# Patient Record
Sex: FEMALE | URBAN - METROPOLITAN AREA
[De-identification: names, ages, dates, MRNs, and addresses within clinical notes are randomized per-mention and may not be internally consistent; named-entity substitution may affect disease eponyms.]

---

## 2017-03-11 ENCOUNTER — OUTPATIENT (OUTPATIENT)
Dept: OUTPATIENT SERVICES | Facility: HOSPITAL | Age: 67
LOS: 1 days | Discharge: HOME | End: 2017-03-11

## 2017-06-27 DIAGNOSIS — R73.09 OTHER ABNORMAL GLUCOSE: ICD-10-CM

## 2017-06-27 DIAGNOSIS — L30.9 DERMATITIS, UNSPECIFIED: ICD-10-CM

## 2023-10-18 ENCOUNTER — APPOINTMENT (OUTPATIENT)
Dept: ORTHOPEDIC SURGERY | Facility: CLINIC | Age: 73
End: 2023-10-18
Payer: MEDICARE

## 2023-10-18 VITALS — BODY MASS INDEX: 33.77 KG/M2 | WEIGHT: 172 LBS | HEIGHT: 60 IN

## 2023-10-18 DIAGNOSIS — S63.502A UNSPECIFIED SPRAIN OF LEFT WRIST, INITIAL ENCOUNTER: ICD-10-CM

## 2023-10-18 PROBLEM — Z00.00 ENCOUNTER FOR PREVENTIVE HEALTH EXAMINATION: Status: ACTIVE | Noted: 2023-10-18

## 2023-10-18 PROCEDURE — L3908: CPT | Mod: LT,KX

## 2023-10-18 PROCEDURE — 99203 OFFICE O/P NEW LOW 30 MIN: CPT

## 2023-10-18 PROCEDURE — 73110 X-RAY EXAM OF WRIST: CPT | Mod: LT

## 2023-10-18 RX ORDER — LOSARTAN POTASSIUM 100 MG/1
TABLET, FILM COATED ORAL
Refills: 0 | Status: ACTIVE | COMMUNITY

## 2023-11-24 ENCOUNTER — APPOINTMENT (OUTPATIENT)
Dept: ORTHOPEDIC SURGERY | Facility: CLINIC | Age: 73
End: 2023-11-24

## 2024-12-03 ENCOUNTER — APPOINTMENT (OUTPATIENT)
Dept: PAIN MANAGEMENT | Facility: CLINIC | Age: 74
End: 2024-12-03
Payer: MEDICARE

## 2024-12-03 ENCOUNTER — APPOINTMENT (OUTPATIENT)
Dept: RADIOLOGY | Facility: CLINIC | Age: 74
End: 2024-12-03

## 2024-12-03 DIAGNOSIS — M17.11 UNILATERAL PRIMARY OSTEOARTHRITIS, RIGHT KNEE: ICD-10-CM

## 2024-12-03 DIAGNOSIS — M17.12 UNILATERAL PRIMARY OSTEOARTHRITIS, LEFT KNEE: ICD-10-CM

## 2024-12-03 DIAGNOSIS — M25.561 PAIN IN RIGHT KNEE: ICD-10-CM

## 2024-12-03 DIAGNOSIS — M25.562 PAIN IN RIGHT KNEE: ICD-10-CM

## 2024-12-03 PROCEDURE — 99204 OFFICE O/P NEW MOD 45 MIN: CPT

## 2024-12-03 PROCEDURE — 73560 X-RAY EXAM OF KNEE 1 OR 2: CPT | Mod: 50

## 2024-12-03 RX ORDER — AMLODIPINE BESYLATE 5 MG/1
TABLET ORAL
Refills: 0 | Status: ACTIVE | COMMUNITY

## 2024-12-19 ENCOUNTER — APPOINTMENT (OUTPATIENT)
Dept: PAIN MANAGEMENT | Facility: CLINIC | Age: 74
End: 2024-12-19
Payer: MEDICARE

## 2024-12-19 DIAGNOSIS — M17.12 UNILATERAL PRIMARY OSTEOARTHRITIS, LEFT KNEE: ICD-10-CM

## 2024-12-19 DIAGNOSIS — M25.561 PAIN IN RIGHT KNEE: ICD-10-CM

## 2024-12-19 DIAGNOSIS — M17.11 UNILATERAL PRIMARY OSTEOARTHRITIS, RIGHT KNEE: ICD-10-CM

## 2024-12-19 DIAGNOSIS — M25.562 PAIN IN RIGHT KNEE: ICD-10-CM

## 2024-12-19 PROCEDURE — 99214 OFFICE O/P EST MOD 30 MIN: CPT

## 2025-01-16 ENCOUNTER — APPOINTMENT (OUTPATIENT)
Dept: PAIN MANAGEMENT | Facility: CLINIC | Age: 75
End: 2025-01-16
Payer: MEDICARE

## 2025-01-16 DIAGNOSIS — M25.561 PAIN IN RIGHT KNEE: ICD-10-CM

## 2025-01-16 DIAGNOSIS — M25.562 PAIN IN RIGHT KNEE: ICD-10-CM

## 2025-01-16 PROCEDURE — 99214 OFFICE O/P EST MOD 30 MIN: CPT

## 2025-01-23 ENCOUNTER — APPOINTMENT (OUTPATIENT)
Dept: PAIN MANAGEMENT | Facility: CLINIC | Age: 75
End: 2025-01-23
Payer: MEDICARE

## 2025-01-23 DIAGNOSIS — M17.12 UNILATERAL PRIMARY OSTEOARTHRITIS, LEFT KNEE: ICD-10-CM

## 2025-01-23 DIAGNOSIS — M17.11 UNILATERAL PRIMARY OSTEOARTHRITIS, RIGHT KNEE: ICD-10-CM

## 2025-01-23 PROCEDURE — 20611 DRAIN/INJ JOINT/BURSA W/US: CPT | Mod: 50

## 2025-02-21 ENCOUNTER — APPOINTMENT (OUTPATIENT)
Dept: PAIN MANAGEMENT | Facility: CLINIC | Age: 75
End: 2025-02-21
Payer: MEDICARE

## 2025-02-21 DIAGNOSIS — M25.562 PAIN IN RIGHT KNEE: ICD-10-CM

## 2025-02-21 DIAGNOSIS — M17.11 UNILATERAL PRIMARY OSTEOARTHRITIS, RIGHT KNEE: ICD-10-CM

## 2025-02-21 DIAGNOSIS — M17.12 UNILATERAL PRIMARY OSTEOARTHRITIS, LEFT KNEE: ICD-10-CM

## 2025-02-21 DIAGNOSIS — M25.561 PAIN IN RIGHT KNEE: ICD-10-CM

## 2025-02-21 PROCEDURE — 99213 OFFICE O/P EST LOW 20 MIN: CPT

## 2025-05-12 ENCOUNTER — INPATIENT (INPATIENT)
Facility: HOSPITAL | Age: 75
LOS: 0 days | Discharge: HOME | DRG: 66 | End: 2025-05-13
Attending: STUDENT IN AN ORGANIZED HEALTH CARE EDUCATION/TRAINING PROGRAM | Admitting: STUDENT IN AN ORGANIZED HEALTH CARE EDUCATION/TRAINING PROGRAM
Payer: MEDICARE

## 2025-05-12 VITALS
RESPIRATION RATE: 18 BRPM | SYSTOLIC BLOOD PRESSURE: 142 MMHG | WEIGHT: 176.37 LBS | DIASTOLIC BLOOD PRESSURE: 82 MMHG | OXYGEN SATURATION: 100 % | TEMPERATURE: 97 F | HEIGHT: 60 IN | HEART RATE: 91 BPM

## 2025-05-12 LAB
ALBUMIN SERPL ELPH-MCNC: 4.1 G/DL — SIGNIFICANT CHANGE UP (ref 3.5–5.2)
ALP SERPL-CCNC: 98 U/L — SIGNIFICANT CHANGE UP (ref 30–115)
ALT FLD-CCNC: 20 U/L — SIGNIFICANT CHANGE UP (ref 0–41)
ANION GAP SERPL CALC-SCNC: 10 MMOL/L — SIGNIFICANT CHANGE UP (ref 7–14)
APTT BLD: 27.9 SEC — SIGNIFICANT CHANGE UP (ref 27–39.2)
AST SERPL-CCNC: 22 U/L — SIGNIFICANT CHANGE UP (ref 0–41)
BASOPHILS # BLD AUTO: 0.04 K/UL — SIGNIFICANT CHANGE UP (ref 0–0.2)
BASOPHILS NFR BLD AUTO: 0.5 % — SIGNIFICANT CHANGE UP (ref 0–1)
BILIRUB SERPL-MCNC: 0.7 MG/DL — SIGNIFICANT CHANGE UP (ref 0.2–1.2)
BUN SERPL-MCNC: 14 MG/DL — SIGNIFICANT CHANGE UP (ref 10–20)
CALCIUM SERPL-MCNC: 8.9 MG/DL — SIGNIFICANT CHANGE UP (ref 8.4–10.5)
CHLORIDE SERPL-SCNC: 106 MMOL/L — SIGNIFICANT CHANGE UP (ref 98–110)
CO2 SERPL-SCNC: 22 MMOL/L — SIGNIFICANT CHANGE UP (ref 17–32)
CREAT SERPL-MCNC: 0.6 MG/DL — LOW (ref 0.7–1.5)
EGFR: 94 ML/MIN/1.73M2 — SIGNIFICANT CHANGE UP
EGFR: 94 ML/MIN/1.73M2 — SIGNIFICANT CHANGE UP
EOSINOPHIL # BLD AUTO: 0.07 K/UL — SIGNIFICANT CHANGE UP (ref 0–0.7)
EOSINOPHIL NFR BLD AUTO: 0.9 % — SIGNIFICANT CHANGE UP (ref 0–8)
GLUCOSE SERPL-MCNC: 158 MG/DL — HIGH (ref 70–99)
HCT VFR BLD CALC: 41.2 % — SIGNIFICANT CHANGE UP (ref 37–47)
HGB BLD-MCNC: 13.7 G/DL — SIGNIFICANT CHANGE UP (ref 12–16)
IMM GRANULOCYTES NFR BLD AUTO: 0.3 % — SIGNIFICANT CHANGE UP (ref 0.1–0.3)
INR BLD: 0.98 RATIO — SIGNIFICANT CHANGE UP (ref 0.65–1.3)
LYMPHOCYTES # BLD AUTO: 1.15 K/UL — LOW (ref 1.2–3.4)
LYMPHOCYTES # BLD AUTO: 14.4 % — LOW (ref 20.5–51.1)
MCHC RBC-ENTMCNC: 31.4 PG — HIGH (ref 27–31)
MCHC RBC-ENTMCNC: 33.3 G/DL — SIGNIFICANT CHANGE UP (ref 32–37)
MCV RBC AUTO: 94.3 FL — SIGNIFICANT CHANGE UP (ref 81–99)
MONOCYTES # BLD AUTO: 0.46 K/UL — SIGNIFICANT CHANGE UP (ref 0.1–0.6)
MONOCYTES NFR BLD AUTO: 5.8 % — SIGNIFICANT CHANGE UP (ref 1.7–9.3)
NEUTROPHILS # BLD AUTO: 6.26 K/UL — SIGNIFICANT CHANGE UP (ref 1.4–6.5)
NEUTROPHILS NFR BLD AUTO: 78.1 % — HIGH (ref 42.2–75.2)
NRBC BLD AUTO-RTO: 0 /100 WBCS — SIGNIFICANT CHANGE UP (ref 0–0)
PLATELET # BLD AUTO: 241 K/UL — SIGNIFICANT CHANGE UP (ref 130–400)
PMV BLD: 9.5 FL — SIGNIFICANT CHANGE UP (ref 7.4–10.4)
POTASSIUM SERPL-MCNC: 4.9 MMOL/L — SIGNIFICANT CHANGE UP (ref 3.5–5)
POTASSIUM SERPL-SCNC: 4.9 MMOL/L — SIGNIFICANT CHANGE UP (ref 3.5–5)
PROT SERPL-MCNC: 6.2 G/DL — SIGNIFICANT CHANGE UP (ref 6–8)
PROTHROM AB SERPL-ACNC: 11.6 SEC — SIGNIFICANT CHANGE UP (ref 9.95–12.87)
RBC # BLD: 4.37 M/UL — SIGNIFICANT CHANGE UP (ref 4.2–5.4)
RBC # FLD: 13.2 % — SIGNIFICANT CHANGE UP (ref 11.5–14.5)
SODIUM SERPL-SCNC: 138 MMOL/L — SIGNIFICANT CHANGE UP (ref 135–146)
WBC # BLD: 8 K/UL — SIGNIFICANT CHANGE UP (ref 4.8–10.8)
WBC # FLD AUTO: 8 K/UL — SIGNIFICANT CHANGE UP (ref 4.8–10.8)

## 2025-05-12 PROCEDURE — 99223 1ST HOSP IP/OBS HIGH 75: CPT

## 2025-05-12 PROCEDURE — 70498 CT ANGIOGRAPHY NECK: CPT | Mod: 26

## 2025-05-12 PROCEDURE — 70496 CT ANGIOGRAPHY HEAD: CPT | Mod: 26

## 2025-05-12 PROCEDURE — 70551 MRI BRAIN STEM W/O DYE: CPT | Mod: 26

## 2025-05-12 PROCEDURE — 70450 CT HEAD/BRAIN W/O DYE: CPT | Mod: 26,XU

## 2025-05-12 RX ORDER — AMLODIPINE BESYLATE 10 MG/1
2.5 TABLET ORAL DAILY
Refills: 0 | Status: DISCONTINUED | OUTPATIENT
Start: 2025-05-12 | End: 2025-05-13

## 2025-05-12 RX ORDER — SODIUM CHLORIDE 9 G/1000ML
1000 INJECTION, SOLUTION INTRAVENOUS ONCE
Refills: 0 | Status: COMPLETED | OUTPATIENT
Start: 2025-05-12 | End: 2025-05-12

## 2025-05-12 RX ORDER — ONDANSETRON HCL/PF 4 MG/2 ML
4 VIAL (ML) INJECTION ONCE
Refills: 0 | Status: COMPLETED | OUTPATIENT
Start: 2025-05-12 | End: 2025-05-12

## 2025-05-12 RX ORDER — MECLIZINE HCL 12.5 MG
50 TABLET ORAL ONCE
Refills: 0 | Status: COMPLETED | OUTPATIENT
Start: 2025-05-12 | End: 2025-05-12

## 2025-05-12 RX ORDER — LOSARTAN POTASSIUM 100 MG/1
100 TABLET, FILM COATED ORAL DAILY
Refills: 0 | Status: DISCONTINUED | OUTPATIENT
Start: 2025-05-12 | End: 2025-05-13

## 2025-05-12 RX ADMIN — Medication 4 MILLIGRAM(S): at 11:09

## 2025-05-12 RX ADMIN — SODIUM CHLORIDE 1000 MILLILITER(S): 9 INJECTION, SOLUTION INTRAVENOUS at 11:09

## 2025-05-12 RX ADMIN — AMLODIPINE BESYLATE 2.5 MILLIGRAM(S): 10 TABLET ORAL at 23:48

## 2025-05-12 RX ADMIN — Medication 50 MILLIGRAM(S): at 11:43

## 2025-05-12 RX ADMIN — LOSARTAN POTASSIUM 100 MILLIGRAM(S): 100 TABLET, FILM COATED ORAL at 23:48

## 2025-05-12 NOTE — ED ADULT NURSE NOTE - PAIN: PRESENCE, MLM
Was the patient seen in the last year in this department? Yes    Does patient have an active prescription for medications requested? No     Received Request Via: Pharmacy   denies pain/discomfort (Rating = 0)

## 2025-05-12 NOTE — ED CDU PROVIDER INITIAL DAY NOTE - OBJECTIVE STATEMENT
75-year-old female with history of hypertension presenting to the ED accompanied by daughter complaining of room spinning dizziness with multiple episodes of nausea vomiting that started at 8 AM while doing exercises in bed.  Patient states symptoms are worse with change in position and movement.  Patient denies headache, weakness, numbness, tingling, chest pain or shortness of breath.

## 2025-05-12 NOTE — ED ADULT NURSE NOTE - OBJECTIVE STATEMENT
Pt A+Ox4, normally amb with steady gait, no assistive devices needed at home.. Pt BIBEMS with c/o dizziness and vomiting upon waking today. Pt states she was gardening yesterday for a while and has never had this issue before.

## 2025-05-12 NOTE — ED PROVIDER NOTE - OBJECTIVE STATEMENT
75yoF with pmhx htn, who presents for new onset room-spinning dizziness that started this am when she awoke. [place] fine yesterday. Notice symptoms worsening with position change and movement. Had associated n/vx1. Denies any focal numbness, tingling, weakness, speech/swallowing difficulty, chest pain, dyspnea, fever, chills, tinnitus, ear pain. Fhx of cva.

## 2025-05-12 NOTE — ED ADULT NURSE NOTE - PRIMARY CARE PROVIDER
Soled[facility] called requesting a callback from on call provider, regarding patient's high glucose level. Provider was paged via ESC   
unk

## 2025-05-12 NOTE — ED CDU PROVIDER INITIAL DAY NOTE - ATTENDING APP SHARED VISIT CONTRIBUTION OF CARE
75-year-old female history of hypertension here for evaluation of dizziness.  Patient's symptoms started around 9:00 worse with head movement position.  She also had a vague left leg sensation that has resolved.  Here exam patient no distress S1-S2 CTAB soft nontender current nerves II through XII intact patient becomes dizzy with head movement and sitting up in bed.  Here CT head as well as CT angio head and neck unremarkable.  Patient seen by neurology who recommends obs for MRI.

## 2025-05-12 NOTE — ED PROVIDER NOTE - PHYSICAL EXAMINATION
Initial vital signs reviewed.  General: NAD, nontoxic appearing.  HENT: AT/NC.  Eyes: non-injected conjunctivae b/l. PERRLA b/l. EOMI b/l without nystagmus.  Neck: supple.  CV: RRR, no murmurs. 2+ distal pulses x4.  Pulm: nonlabored work of breathing, CTAB.  Abd: soft, nondistended, nontender.  MSK: no joint deformity. no edema.  Skin: warm, dry, well-perfused.  Neuro: A&Ox4. CN2-12 grossly intact. moving all ext x4. Sensation intact diffusely. HINTS exam reassuring.  Psych: appropriate mood and affect.

## 2025-05-12 NOTE — ED ADULT NURSE NOTE - NSFALLRISKINTERV_ED_ALL_ED

## 2025-05-12 NOTE — CONSULT NOTE ADULT - ASSESSMENT
75F. PMH: HTN. Presented for dizziness with standing this morning, lasting seconds at a time with position changes and relieved at rest. Exam shows unidirectional transient nystagmus. CTH/CTA unremarkable. Patient's mother has had multiple strokes.     Plan:  - MR Brain NonCon in OBS  - C/w home aspirin  75F. PMH: HTN. Presented for dizziness with standing this morning, lasting seconds at a time with position changes and relieved at rest. Exam shows unidirectional transient nystagmus. CTH/CTA unremarkable. Patient's mother has had multiple strokes in the past.  Suspect peripheral vertigo possibly BPPV but still symptomatic recommend r/o central etiology.     Plan:  - MR Brain NonCon in OBS  - C/w home aspirin 81 QD  - meclizine 25 mg TID   - antiemetics PRN  - if MRI (-), may d/c w/ outpt f/u w/ ENT if still symptomatic

## 2025-05-12 NOTE — ED PROVIDER NOTE - CLINICAL SUMMARY MEDICAL DECISION MAKING FREE TEXT BOX
75-year-old female history of hypertension here for valuation of dizziness.  Patient's symptoms started around 9:00 worse with head movement position.  She has also vague left leg sensation that has resolved.  Here exam patient no distress S1-S2 CTAB soft nontender current nerves II through XII intact patient becomes dizzy with head movement and sitting up in bed.  Here CT head as well as CT angio head and neck unremarkable.  Patient seen by neurology who recommends obs for MRI. 75-year-old female history of hypertension here for evaluation of dizziness.  Patient's symptoms started around 9:00 worse with head movement position.  She also had a vague left leg sensation that has resolved.  Here exam patient no distress S1-S2 CTAB soft nontender current nerves II through XII intact patient becomes dizzy with head movement and sitting up in bed.  Here CT head as well as CT angio head and neck unremarkable.  Patient seen by neurology who recommends obs for MRI.

## 2025-05-12 NOTE — ED PROVIDER NOTE - IN ACCORDANCE WITH [place] LAW, WE OFFER EVERY PATIENT A HEPATITIS C TEST. WOULD YOU LIKE TO BE TESTED TODAY?
Opt out Mohs Method Verbiage: An incision at a 45 degree angle following the standard Mohs approach was done and the specimen was harvested as a microscopic controlled layer.

## 2025-05-13 ENCOUNTER — TRANSCRIPTION ENCOUNTER (OUTPATIENT)
Age: 75
End: 2025-05-13

## 2025-05-13 ENCOUNTER — RESULT REVIEW (OUTPATIENT)
Age: 75
End: 2025-05-13

## 2025-05-13 VITALS
TEMPERATURE: 98 F | SYSTOLIC BLOOD PRESSURE: 145 MMHG | DIASTOLIC BLOOD PRESSURE: 80 MMHG | OXYGEN SATURATION: 98 % | RESPIRATION RATE: 18 BRPM | HEART RATE: 78 BPM

## 2025-05-13 LAB
A1C WITH ESTIMATED AVERAGE GLUCOSE RESULT: 5.6 % — SIGNIFICANT CHANGE UP (ref 4–5.6)
ALBUMIN SERPL ELPH-MCNC: 3.6 G/DL — SIGNIFICANT CHANGE UP (ref 3.5–5.2)
ALP SERPL-CCNC: 95 U/L — SIGNIFICANT CHANGE UP (ref 30–115)
ALT FLD-CCNC: 16 U/L — SIGNIFICANT CHANGE UP (ref 0–41)
ANION GAP SERPL CALC-SCNC: 10 MMOL/L — SIGNIFICANT CHANGE UP (ref 7–14)
AST SERPL-CCNC: 15 U/L — SIGNIFICANT CHANGE UP (ref 0–41)
BASOPHILS # BLD AUTO: 0.04 K/UL — SIGNIFICANT CHANGE UP (ref 0–0.2)
BASOPHILS NFR BLD AUTO: 0.6 % — SIGNIFICANT CHANGE UP (ref 0–1)
BILIRUB SERPL-MCNC: 0.5 MG/DL — SIGNIFICANT CHANGE UP (ref 0.2–1.2)
BUN SERPL-MCNC: 15 MG/DL — SIGNIFICANT CHANGE UP (ref 10–20)
CALCIUM SERPL-MCNC: 8.8 MG/DL — SIGNIFICANT CHANGE UP (ref 8.4–10.5)
CHLORIDE SERPL-SCNC: 110 MMOL/L — SIGNIFICANT CHANGE UP (ref 98–110)
CHOLEST SERPL-MCNC: 189 MG/DL — SIGNIFICANT CHANGE UP
CO2 SERPL-SCNC: 23 MMOL/L — SIGNIFICANT CHANGE UP (ref 17–32)
CREAT SERPL-MCNC: 0.7 MG/DL — SIGNIFICANT CHANGE UP (ref 0.7–1.5)
EGFR: 91 ML/MIN/1.73M2 — SIGNIFICANT CHANGE UP
EGFR: 91 ML/MIN/1.73M2 — SIGNIFICANT CHANGE UP
EOSINOPHIL # BLD AUTO: 0.15 K/UL — SIGNIFICANT CHANGE UP (ref 0–0.7)
EOSINOPHIL NFR BLD AUTO: 2.2 % — SIGNIFICANT CHANGE UP (ref 0–8)
ESTIMATED AVERAGE GLUCOSE: 114 MG/DL — SIGNIFICANT CHANGE UP (ref 68–114)
GLUCOSE SERPL-MCNC: 90 MG/DL — SIGNIFICANT CHANGE UP (ref 70–99)
HCT VFR BLD CALC: 39.5 % — SIGNIFICANT CHANGE UP (ref 37–47)
HDLC SERPL-MCNC: 38 MG/DL — LOW
HGB BLD-MCNC: 12.9 G/DL — SIGNIFICANT CHANGE UP (ref 12–16)
IMM GRANULOCYTES NFR BLD AUTO: 0.3 % — SIGNIFICANT CHANGE UP (ref 0.1–0.3)
LDLC SERPL-MCNC: 140 MG/DL — HIGH
LIPID PNL WITH DIRECT LDL SERPL: 140 MG/DL — HIGH
LYMPHOCYTES # BLD AUTO: 2.68 K/UL — SIGNIFICANT CHANGE UP (ref 1.2–3.4)
LYMPHOCYTES # BLD AUTO: 38.5 % — SIGNIFICANT CHANGE UP (ref 20.5–51.1)
MAGNESIUM SERPL-MCNC: 2.4 MG/DL — SIGNIFICANT CHANGE UP (ref 1.8–2.4)
MCHC RBC-ENTMCNC: 31 PG — SIGNIFICANT CHANGE UP (ref 27–31)
MCHC RBC-ENTMCNC: 32.7 G/DL — SIGNIFICANT CHANGE UP (ref 32–37)
MCV RBC AUTO: 95 FL — SIGNIFICANT CHANGE UP (ref 81–99)
MONOCYTES # BLD AUTO: 0.62 K/UL — HIGH (ref 0.1–0.6)
MONOCYTES NFR BLD AUTO: 8.9 % — SIGNIFICANT CHANGE UP (ref 1.7–9.3)
NEUTROPHILS # BLD AUTO: 3.46 K/UL — SIGNIFICANT CHANGE UP (ref 1.4–6.5)
NEUTROPHILS NFR BLD AUTO: 49.5 % — SIGNIFICANT CHANGE UP (ref 42.2–75.2)
NONHDLC SERPL-MCNC: 151 MG/DL — HIGH
NRBC BLD AUTO-RTO: 0 /100 WBCS — SIGNIFICANT CHANGE UP (ref 0–0)
PLATELET # BLD AUTO: 242 K/UL — SIGNIFICANT CHANGE UP (ref 130–400)
PMV BLD: 9.8 FL — SIGNIFICANT CHANGE UP (ref 7.4–10.4)
POTASSIUM SERPL-MCNC: 4.7 MMOL/L — SIGNIFICANT CHANGE UP (ref 3.5–5)
POTASSIUM SERPL-SCNC: 4.7 MMOL/L — SIGNIFICANT CHANGE UP (ref 3.5–5)
PROT SERPL-MCNC: 6 G/DL — SIGNIFICANT CHANGE UP (ref 6–8)
RBC # BLD: 4.16 M/UL — LOW (ref 4.2–5.4)
RBC # FLD: 13.5 % — SIGNIFICANT CHANGE UP (ref 11.5–14.5)
SODIUM SERPL-SCNC: 143 MMOL/L — SIGNIFICANT CHANGE UP (ref 135–146)
TRIGL SERPL-MCNC: 58 MG/DL — SIGNIFICANT CHANGE UP
TSH SERPL-MCNC: 1.56 UIU/ML — SIGNIFICANT CHANGE UP (ref 0.27–4.2)
WBC # BLD: 6.97 K/UL — SIGNIFICANT CHANGE UP (ref 4.8–10.8)
WBC # FLD AUTO: 6.97 K/UL — SIGNIFICANT CHANGE UP (ref 4.8–10.8)

## 2025-05-13 PROCEDURE — 80053 COMPREHEN METABOLIC PANEL: CPT

## 2025-05-13 PROCEDURE — 33285 INSJ SUBQ CAR RHYTHM MNTR: CPT

## 2025-05-13 PROCEDURE — 97167 OT EVAL HIGH COMPLEX 60 MIN: CPT | Mod: GO

## 2025-05-13 PROCEDURE — 97161 PT EVAL LOW COMPLEX 20 MIN: CPT | Mod: GP

## 2025-05-13 PROCEDURE — 85025 COMPLETE CBC W/AUTO DIFF WBC: CPT

## 2025-05-13 PROCEDURE — 84443 ASSAY THYROID STIM HORMONE: CPT

## 2025-05-13 PROCEDURE — 83735 ASSAY OF MAGNESIUM: CPT

## 2025-05-13 PROCEDURE — 36415 COLL VENOUS BLD VENIPUNCTURE: CPT

## 2025-05-13 PROCEDURE — 80061 LIPID PANEL: CPT

## 2025-05-13 PROCEDURE — 99223 1ST HOSP IP/OBS HIGH 75: CPT

## 2025-05-13 PROCEDURE — 92610 EVALUATE SWALLOWING FUNCTION: CPT | Mod: GN

## 2025-05-13 PROCEDURE — 99233 SBSQ HOSP IP/OBS HIGH 50: CPT

## 2025-05-13 PROCEDURE — 99222 1ST HOSP IP/OBS MODERATE 55: CPT

## 2025-05-13 PROCEDURE — 93306 TTE W/DOPPLER COMPLETE: CPT | Mod: 26

## 2025-05-13 PROCEDURE — 92523 SPEECH SOUND LANG COMPREHEN: CPT | Mod: GN

## 2025-05-13 PROCEDURE — 93306 TTE W/DOPPLER COMPLETE: CPT

## 2025-05-13 PROCEDURE — C1764: CPT

## 2025-05-13 PROCEDURE — 99223 1ST HOSP IP/OBS HIGH 75: CPT | Mod: 57

## 2025-05-13 PROCEDURE — 83036 HEMOGLOBIN GLYCOSYLATED A1C: CPT

## 2025-05-13 RX ORDER — ATORVASTATIN CALCIUM 80 MG/1
1 TABLET, FILM COATED ORAL
Qty: 30 | Refills: 1
Start: 2025-05-13 | End: 2025-07-11

## 2025-05-13 RX ORDER — ENOXAPARIN SODIUM 100 MG/ML
40 INJECTION SUBCUTANEOUS EVERY 24 HOURS
Refills: 0 | Status: DISCONTINUED | OUTPATIENT
Start: 2025-05-13 | End: 2025-05-13

## 2025-05-13 RX ORDER — ASPIRIN 325 MG
81 TABLET ORAL DAILY
Refills: 0 | Status: DISCONTINUED | OUTPATIENT
Start: 2025-05-13 | End: 2025-05-13

## 2025-05-13 RX ORDER — ACETAMINOPHEN 500 MG/5ML
650 LIQUID (ML) ORAL EVERY 6 HOURS
Refills: 0 | Status: DISCONTINUED | OUTPATIENT
Start: 2025-05-13 | End: 2025-05-13

## 2025-05-13 RX ORDER — CLOPIDOGREL BISULFATE 75 MG/1
1 TABLET, FILM COATED ORAL
Qty: 19 | Refills: 0
Start: 2025-05-13 | End: 2025-05-31

## 2025-05-13 RX ORDER — SULFAMETHOXAZOLE/TRIMETHOPRIM 800-160 MG
1 TABLET ORAL ONCE
Refills: 0 | Status: COMPLETED | OUTPATIENT
Start: 2025-05-13 | End: 2025-05-13

## 2025-05-13 RX ORDER — ONDANSETRON HCL/PF 4 MG/2 ML
4 VIAL (ML) INJECTION THREE TIMES A DAY
Refills: 0 | Status: DISCONTINUED | OUTPATIENT
Start: 2025-05-13 | End: 2025-05-13

## 2025-05-13 RX ORDER — MECLIZINE HCL 12.5 MG
1 TABLET ORAL
Qty: 15 | Refills: 0
Start: 2025-05-13 | End: 2025-05-17

## 2025-05-13 RX ORDER — ASPIRIN 325 MG
1 TABLET ORAL
Qty: 30 | Refills: 1
Start: 2025-05-13 | End: 2025-07-11

## 2025-05-13 RX ORDER — CLOPIDOGREL BISULFATE 75 MG/1
300 TABLET, FILM COATED ORAL ONCE
Refills: 0 | Status: COMPLETED | OUTPATIENT
Start: 2025-05-13 | End: 2025-05-13

## 2025-05-13 RX ORDER — AMLODIPINE BESYLATE 10 MG/1
1 TABLET ORAL
Qty: 0 | Refills: 0 | DISCHARGE
Start: 2025-05-13

## 2025-05-13 RX ORDER — CLOPIDOGREL BISULFATE 75 MG/1
75 TABLET, FILM COATED ORAL DAILY
Refills: 0 | Status: DISCONTINUED | OUTPATIENT
Start: 2025-05-13 | End: 2025-05-13

## 2025-05-13 RX ORDER — ASPIRIN 325 MG
1 TABLET ORAL
Refills: 0 | DISCHARGE

## 2025-05-13 RX ORDER — LOSARTAN POTASSIUM 100 MG/1
100 TABLET, FILM COATED ORAL DAILY
Refills: 0 | Status: DISCONTINUED | OUTPATIENT
Start: 2025-05-13 | End: 2025-05-13

## 2025-05-13 RX ORDER — MECLIZINE HCL 12.5 MG
25 TABLET ORAL THREE TIMES A DAY
Refills: 0 | Status: DISCONTINUED | OUTPATIENT
Start: 2025-05-13 | End: 2025-05-13

## 2025-05-13 RX ORDER — AMLODIPINE BESYLATE 10 MG/1
2.5 TABLET ORAL DAILY
Refills: 0 | Status: DISCONTINUED | OUTPATIENT
Start: 2025-05-13 | End: 2025-05-13

## 2025-05-13 RX ORDER — MELATONIN 5 MG
3 TABLET ORAL AT BEDTIME
Refills: 0 | Status: DISCONTINUED | OUTPATIENT
Start: 2025-05-13 | End: 2025-05-13

## 2025-05-13 RX ORDER — AMLODIPINE BESYLATE 10 MG/1
1 TABLET ORAL
Refills: 0 | DISCHARGE

## 2025-05-13 RX ORDER — LOSARTAN POTASSIUM 100 MG/1
1 TABLET, FILM COATED ORAL
Qty: 0 | Refills: 0 | DISCHARGE
Start: 2025-05-13

## 2025-05-13 RX ORDER — ATORVASTATIN CALCIUM 80 MG/1
80 TABLET, FILM COATED ORAL AT BEDTIME
Refills: 0 | Status: DISCONTINUED | OUTPATIENT
Start: 2025-05-13 | End: 2025-05-13

## 2025-05-13 RX ORDER — LOSARTAN POTASSIUM 100 MG/1
1 TABLET, FILM COATED ORAL
Refills: 0 | DISCHARGE

## 2025-05-13 RX ADMIN — ENOXAPARIN SODIUM 40 MILLIGRAM(S): 100 INJECTION SUBCUTANEOUS at 06:16

## 2025-05-13 RX ADMIN — Medication 81 MILLIGRAM(S): at 11:04

## 2025-05-13 RX ADMIN — CLOPIDOGREL BISULFATE 75 MILLIGRAM(S): 75 TABLET, FILM COATED ORAL at 17:05

## 2025-05-13 RX ADMIN — Medication 1 TABLET(S): at 16:17

## 2025-05-13 RX ADMIN — CLOPIDOGREL BISULFATE 300 MILLIGRAM(S): 75 TABLET, FILM COATED ORAL at 01:32

## 2025-05-13 NOTE — OCCUPATIONAL THERAPY INITIAL EVALUATION ADULT - DIAGNOSIS, OT EVAL
Debility due to punctate acute infarct of left body of corpus callosum. Etiology likely , less likely embolic.

## 2025-05-13 NOTE — CONSULT NOTE ADULT - NS ATTEND AMEND GEN_ALL_CORE FT
Acute punctate CVA of the left corpus callosum  Neuro recommending ILR and patient endorses intermittent palpitations  It is reasonable to consider an ILR for long-term rhythm monitoring to rule out occult AF  Discussed at length with the daughter, a physician, and the patient   Will plan for tomorrow     I discussed with patient the placement of an implantable loop recorder for long-term detection of atrial tachyarrhythmias including Atrial Fibrillation, as a possible cause of cryptogenic stroke. I explained to patient in great details, that in case of presence of atrial fibrillation, full dose anticoagulation needs to be started. I explained risks, benefits, alternatives, the nature of the procedure, and follow up care after device is implanted. We also discussed remote monitoring in detail, including monthly billing, and the possibility of copays/deductibles. I also discussed the risks of bleeding, hematoma, infection, device malfunction, device recalls/advisory, and sensitivity to cardiac monitor material. Patient/family expressed understanding of discussion and agreement to proceed with the implantation of a loop recorder.

## 2025-05-13 NOTE — H&P ADULT - ASSESSMENT
75F w/ PMHx of HTN & b/l knee pain 2/2 OA, presented for episodic dizziness with position changes since  morning. Exam shows unidirectional transient nystagmus. NIHSS 0. CTH/CTA unremarkable. 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 Patient admitted to stroke service for monitoring, stroke workup and stroke prevention.    #punctate acute infarct of left body of corpus callosum. Etiology likely , less likely embolic.  - Home antiplatelets: aspirin 81mg  - Tele monitoring  - Neurochecks and vital signs q4 hrs  - Systolic BP goal: permissive [id number] for first 24hs after initial symptom onset; then systolic goal: 120-180  - Finger stick goal of 120-180  - Continue with Aspirin 81mg  - Administer Plavix 300mg, then Plavix 75mg qD starting the following day  - Atorvastatin 80mg daily  - Obtain TTE w/ bubbles  - Obtain HbA1c, TSH and Lipid panel  - Physical therapy/Occupational therapy/Speech and Swallow/Physiatry eval  - Fall and Aspiration precautions  - Provide stroke education    #Vertigo, likely BPPV  - c/w meclizine 25mg TID  - antiemetics PRN  - vestibular therapy & ENT f/u outpatient if symptom persists    #HTN  - hold home meds for now ( )    #Misc:  - DVT Prophylaxis: Lovenox  - Diet: NPO until s/s eval  - GI Prophylaxis: NI  - Activity: IAT  - Code status: Full code  - Dispo: Stroke Unit   75F w/ PMHx of HTN & b/l knee pain 2/2 OA, presented for episodic dizziness with position changes since  morning. Exam shows unidirectional transient nystagmus. NIHSS 0. CTH/CTA unremarkable. 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 Patient admitted to stroke service for monitoring, stroke workup and stroke prevention.    #punctate acute infarct of left body of corpus callosum. Etiology likely , less likely embolic.  - Home antiplatelets: aspirin 81mg  - Tele monitoring  - Neurochecks and vital signs q4 hrs  - Systolic BP goal: permissive [id number] for first 24hs after initial symptom onset; then systolic goal: 120-180  - Finger stick goal of 120-180  - Continue with Aspirin 81mg  - Administer Plavix 300mg, then Plavix 75mg qD starting the following day  - Atorvastatin 80mg daily  - Obtain TTE w/ bubbles  - Obtain HbA1c, TSH and Lipid panel  - Physical therapy/Occupational therapy/Speech and Swallow/Physiatry eval  - Fall and Aspiration precautions  - Provide stroke education    #Vertigo, likely BPPV  - c/w meclizine 25mg TID  - antiemetics PRN  - vestibular therapy & ENT f/u outpatient if symptom persists    #HTN  - home meds: Losartan 100mg qD, amlodipine 2.5mg qD  - hold for now and continue as appropriate    #OA  - Tylenol PRN  - PT    #Misc:  - DVT Prophylaxis: Lovenox  - Diet: NPO until s/s eval --> DASH  - GI Prophylaxis: NI  - Activity: IAT  - Code status: Full code  - Dispo: Stroke Unit

## 2025-05-13 NOTE — H&P ADULT - HISTORY OF PRESENT ILLNESS
75F PMH of HTN & b/l knee pain 2/2 OA, presented for dizziness with standing since Monday 5/12 morning. Patient noticed episodes where it felt like everything was moving around her lasting a few seconds, relieved with rest. She endorses one episode of vomiting. She checked her BP at home both laying and sitting and was not orthostatic. Denies facial droop, double vision, difficulty swallowing, numbness or weakness in the legs aside from chronic orthopedic pains and sciatica.    In the ED on presentation:  BP: 142/82. Glucose 158.  CT HEAD: No acute intracranial pathology.  CTA HEAD/NECK: No large vessel occlusion, stenosis, aneurysm, or vascular malformation.  General neurology was consulted.     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 When seen by author, patient is asymptomatic resting in bed but has symptoms when she tries to sit up that last for less than a minute.   75F PMH of HTN & b/l knee pain 2/2 OA, presented for dizziness with standing since Monday 5/12 morning. Patient noticed episodes where it felt like everything was moving around her lasting a few seconds, relieved with rest. She endorses one episode of vomiting. She checked her BP at home both laying and sitting and was not orthostatic. Denies facial droop, double vision, difficulty swallowing, numbness or weakness in the legs aside from chronic orthopedic pains and sciatica.     In the ED on presentation: BP: 142/82. Glucose 158. CT HEAD: No acute intracranial pathology. CTA HEAD/NECK: No large vessel occlusion, stenosis, aneurysm, or vascular malformation. General neurology was consulted. 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     When seen by author, patient is asymptomatic resting in bed but has symptoms when she tries to sit up that last for less than a minute. Patient says her BP is usually well-controlled but in January after the steroid injection of the knees, her SBP was in the 160-180s only for a day. Patient denies any substance use. She works at a pharmaceutical company.

## 2025-05-13 NOTE — ED CDU PROVIDER DISPOSITION NOTE - CLINICAL COURSE
Labs and EKG were ordered and reviewed.  Imaging was ordered and reviewed by me.  Appropriate medications for patient's presenting complaints were ordered and effects were reassessed.  Patient's records (prior hospital, ED visit, and/or nursing home notes if available) were reviewed.  Additional history was obtained from EMS, family, and/or PCP (where available).  Escalation to admission/observation was considered.  Patient requires inpatient hospitalization - monitored setting.  MRI revealed acute infarct.  Neuro requested stroke unit admission and loading with clopidogrel.

## 2025-05-13 NOTE — CHART NOTE - NSCHARTNOTEFT_GEN_A_CORE
Electrophysiology Brief Post-Op Note    I have personally seen and examined the patient.  I agree with the history and physical which I have reviewed and noted any changes below.  05-13-25 @ 16:19    PRE-OP DIAGNOSIS:  Cryptogenic CVA    POST-OP DIAGNOSIS: Cryptogenic CVA    PROCEDURE: Loop Implant    Physician: Dr. Dawson  Assistant: Katherine Cordova    ESTIMATED BLOOD LOSS:  2    mL    ANESTHESIA TYPE:  [  ]General Anesthesia  [  ] Sedation  [X  ] Local/Regional    CONDITION  [  ] Critical  [  ] Serious  [  ]Fair  [ X ]Good    SPECIMENS REMOVED (IF APPLICABLE):  none    IMPLANTS (IF APPLICABLE)  Loop Recorder ([employer])    FINDINGS  PLAN OF CARE  - F/U 3-4 weeks  - May remove bandaid tomorrow  - May shower in 48 hours
DX CVA  Pt has decreased cristal impaired balance; decreased strength and endurance. Needs RW for safe ambulation. significantly improve the pt’s ability to participate in MRADL’s and will be used on a regular basis in the home.

## 2025-05-13 NOTE — DISCHARGE NOTE PROVIDER - NSDCCPCAREPLAN_GEN_ALL_CORE_FT
PRINCIPAL DISCHARGE DIAGNOSIS  Diagnosis: Acute cerebral infarction  Assessment and Plan of Treatment: During this hospital admission, you had an ischemic stroke. During an ischemic stroke, blood stops flowing to part of your brain because of a blockage in the blood vessel. This can damage areas in the brain that control other parts of the body.  Please take your aspirin and plavix for blood thinning and Atorvastatin for cholesterol medication/blood vessel protection as prescribed to prevent further strokes. Do not skip doses and do not run low on your medication. If you run low on your medication, please contact your doctor.  You will follow up outpatient with the stroke neurologist within 2-4 weeks and with the electrophysiologist to assess your loop recorder within 4 weeks.  Doing your regular tasks may be difficult after you've had a stroke, but you can learn new ways to manage your daily activities. In fact, doing daily activities may help you to regain muscle strength. Be patient, give yourself time to adjust, and appreciate the progress you make. For example, when showering or bathing, test the water temperature with a hand or foot that was not affected by the stroke, use grab bars, a shower seat, a hand-held showerhead, etc if needed. It is normal to feel fatigue after a stroke, while some days may be worse than others, you will continue to improve. If you smoke, please refrain from smoking as it increases your risk for another stroke.  Call 911 right away if you have any of the following symptoms of another stroke:  B: Balance: Sudden: Dizziness, loss of balance, or a sense of falling, difficulty with coordinating movement  E: Eyes: Sudden double vision or trouble seeing in one or both eyes  F: Face: Sudden uneven face  A: Arms (Legs): Sudden weakness, tingling, or loss of feeling on one side of your face or body  S: Speech: Sudden trouble talking or slurred speech, sudden difficulty understanding others  T: Time: Please call 911 right away and go to the emergency room  •Sudden, severe headache  •Blackouts or seizures     PRINCIPAL DISCHARGE DIAGNOSIS  Diagnosis: Acute cerebral infarction  Assessment and Plan of Treatment: During this hospital admission, you had an ischemic stroke. During an ischemic stroke, blood stops flowing to part of your brain because of a blockage in the blood vessel. This can damage areas in the brain that control other parts of the body.  Please take your aspirin and plavix for blood thinning and Atorvastatin for cholesterol medication/blood vessel protection as prescribed to prevent further strokes. Do not skip doses and do not run low on your medication. If you run low on your medication, please contact your doctor.  You will follow up outpatient with the stroke neurologist within 2-4 weeks and with the electrophysiologist to assess your loop recorder within 4 weeks.  Doing your regular tasks may be difficult after you've had a stroke, but you can learn new ways to manage your daily activities. In fact, doing daily activities may help you to regain muscle strength. Be patient, give yourself time to adjust, and appreciate the progress you make. For example, when showering or bathing, test the water temperature with a hand or foot that was not affected by the stroke, use grab bars, a shower seat, a hand-held showerhead, etc if needed. It is normal to feel fatigue after a stroke, while some days may be worse than others, you will continue to improve. If you smoke, please refrain from smoking as it increases your risk for another stroke.  Call 911 right away if you have any of the following symptoms of another stroke:  B: Balance: Sudden: Dizziness, loss of balance, or a sense of falling, difficulty with coordinating movement  E: Eyes: Sudden double vision or trouble seeing in one or both eyes  F: Face: Sudden uneven face  A: Arms (Legs): Sudden weakness, tingling, or loss of feeling on one side of your face or body  S: Speech: Sudden trouble talking or slurred speech, sudden difficulty understanding others  T: Time: Please call 911 right away and go to the emergency room  •Sudden, severe headache  •Blackouts or seizures      SECONDARY DISCHARGE DIAGNOSES  Diagnosis: HTN (hypertension)  Assessment and Plan of Treatment: Hypertension is the medical term for high blood pressure. Blood pressure refers to the pressure that blood applies to the inner walls of the arteries. Arteries carry blood from the heart to other organs and parts of the body. Untreated high blood pressure increases the strain on the heart and arteries, eventually causing organ damage. High blood pressure increases the risk of heart failure, heart attack (myocardial infarction), stroke, and kidney failure. High blood pressure does not usually cause any symptoms. Treatment of hypertension usually begins with lifestyle changes. Making these lifestyle changes involves little or no risk. Recommended changes often include reducing the amount of salt in your diet, losing weight if you are overweight or obese, avoiding drinking too much alcohol, stopping smoking and exercising at least 30 minutes per day most days of the week. If you are prescribed medication for your hypertension it is important to take these as prescribed to prevent the possible complications of uncontrolled hypertension.

## 2025-05-13 NOTE — ED CDU PROVIDER SUBSEQUENT DAY NOTE - CLINICAL SUMMARY MEDICAL DECISION MAKING FREE TEXT BOX
Labs and EKG were ordered and reviewed.  Imaging was ordered and reviewed by me.  Appropriate medications for patient's presenting complaints were ordered and effects were reassessed.  Patient's records (prior hospital, ED visit, and/or nursing home notes if available) were reviewed.  Additional history was obtained from EMS, family, and/or PCP (where available).  Escalation to admission/observation was considered.  Patient requires inpatient hospitalization - monitored setting after MRI resulted and revealed an acute lacunar infarct.  Stroke unit admission. none

## 2025-05-13 NOTE — DISCHARGE NOTE PROVIDER - CARE PROVIDER_API CALL
Clyde Mejía  Neurology  [address] [place] [id number]  Phone: [phone]  Fax: [phone]  Follow Up Time: 2 weeks   Bed/Stretcher in lowest position, wheels locked, appropriate side rails in place/Call bell, personal items and telephone in reach/Instruct patient to call for assistance before getting out of bed/chair/stretcher/Non-slip footwear applied when patient is off stretcher/[place] to call system/Physically safe environment - no spills, clutter or unnecessary equipment/Purposeful proactive rounding/Room/bathroom lighting operational, light cord in reach

## 2025-05-13 NOTE — OCCUPATIONAL THERAPY INITIAL EVALUATION ADULT - PATIENT PROFILE REVIEW, REHAB EVAL
Pt's chart reviewed prior to OT eval./yes
Pt's chart reviewed prior to OT eval. Eval time: 11:30-12:00/yes

## 2025-05-13 NOTE — ED CDU PROVIDER SUBSEQUENT DAY NOTE - NSICDXPASTMEDICALHX_GEN_ALL_CORE_FT
PAST MEDICAL HISTORY:  Hypertension      PAST MEDICAL HISTORY:  Hypertension     Osteoarthritis

## 2025-05-13 NOTE — DISCHARGE NOTE NURSING/CASE MANAGEMENT/SOCIAL WORK - FINANCIAL ASSISTANCE
[facility] provides services at a reduced cost to those who are determined to be eligible through [facility]’s financial assistance program. Information regarding [facility]’s financial assistance program can be found by going to [email or web address] or by calling [phone].

## 2025-05-13 NOTE — DISCHARGE NOTE PROVIDER - NSDCFUSCHEDAPPT_GEN_ALL_CORE_FT
[facility]  [facility] [id number]  Scheduled Appointment: 06/20/2025

## 2025-05-13 NOTE — CONSULT NOTE ADULT - TIME BILLING
above.  Total time spent includes but is not limited to personal review of patient chart, available results, collateral information (if necessary) and examination of patient at bedside and time spent formulating assessment and recommendations independent of teaching time.
Chart, telemetry, imaging review, discussion with teams, diagnosis: CVA    Rupa Dawson MD  Cardiac Electrophysiology

## 2025-05-13 NOTE — OCCUPATIONAL THERAPY INITIAL EVALUATION ADULT - GENERAL OBSERVATIONS, REHAB EVAL
Pt encountered seated at b/s recliner in NAD, has been ambulating to bathroom via rw with spouse and daughter, +IV locked +tele +pulse oxi +bp cuff. Pt agreed to OT eval, stated that dizziness only happened when head tilt forward, bp within [id number] throughout. Pt left seated at b/s recliner with daughter at bedside, KAREN black.

## 2025-05-13 NOTE — PHYSICAL THERAPY INITIAL EVALUATION ADULT - GENERAL OBSERVATIONS, REHAB EVAL
14:05-14:43 Pt encountered semifowler in bed in NAD, daughter at bedside. Pt agreeable for PT. + tele. BP supine 159/73, seated: 154/72.  No c/o offered during tx session

## 2025-05-13 NOTE — PHYSICAL THERAPY INITIAL EVALUATION ADULT - GAIT TRAINING, PT EVAL
by discharge: 150 f t x2 with/or without RW independently; 1 flight of steps with 1 rail supervision

## 2025-05-13 NOTE — CONSULT NOTE ADULT - SUBJECTIVE AND OBJECTIVE BOX
CC: Patient is a 75y old  Female who presents with a chief complaint of stroke (13 May 2025 15:39)      HPI:  75F PMH of HTN & b/l knee pain 2/2 OA, presented for dizziness with standing since Monday 5/12 morning. Patient noticed episodes where it felt like everything was moving around her lasting a few seconds, relieved with rest. She endorses one episode of vomiting. She checked her BP at home both laying and sitting and was not orthostatic. Denies facial droop, double vision, difficulty swallowing, numbness or weakness in the legs aside from chronic orthopedic pains and sciatica.     In the ED on presentation: BP: 142/82. Glucose 158. CT HEAD: No acute intracranial pathology. CTA HEAD/NECK: No large vessel occlusion, stenosis, aneurysm, or vascular malformation. General neurology was consulted. 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     When seen by author, patient is asymptomatic resting in bed but has symptoms when she tries to sit up that last for less than a minute. Patient says her BP is usually well-controlled but in January after the steroid injection of the knees, her SBP was in the 160-180s only for a day. Patient denies any substance use. She works at a pharmaceutical company.   (13 May 2025 00:28)      Patient seen and examined at bedside.   No acute overnight events.   No acute complaints this morning.   ROS otherwise negative on a 10-point assessment.     PAST MEDICAL & SURGICAL HISTORY:  Hypertension      Osteoarthritis        SOCIAL HISTORY:  Tobacco Usage:  (   ) never smoked   (   ) former smoker   (   ) current smoker  (     ) pack years    Tobacco Quit Date:  Substance Use (Street drugs): (  ) never used  (  ) other:  Alcohol Usage:    Family history reviewed and otherwise non-contributory  ALLERGIES: penicillins (Unknown)    MEDICATIONS:  MEDICATIONS  (STANDING):  amLODIPine   Tablet 2.5 milliGRAM(s) Oral daily  aspirin enteric coated 81 milliGRAM(s) Oral daily  atorvastatin 80 milliGRAM(s) Oral at bedtime  clopidogrel Tablet 75 milliGRAM(s) Oral daily  enoxaparin Injectable 40 milliGRAM(s) SubCutaneous every 24 hours  losartan 100 milliGRAM(s) Oral daily    MEDICATIONS  (PRN):  acetaminophen     Tablet .. 650 milliGRAM(s) Oral every 6 hours PRN Temp greater or equal to 38.5C (101.3F), Moderate Pain (4 - 6), Severe Pain (7 - 10)  meclizine 25 milliGRAM(s) Oral three times a day PRN Dizziness  melatonin 3 milliGRAM(s) Oral at bedtime PRN Insomnia  ondansetron    Tablet 4 milliGRAM(s) Oral three times a day PRN Nausea and/or Vomiting      Home Medications:  amLODIPine 2.5 mg oral tablet: 1 tab(s) orally once a day (13 May 2025 15:46)  losartan 100 mg oral tablet: 1 tab(s) orally once a day (13 May 2025 15:46)      Vital Signs Last 24 Hrs  T(F): 97.7 (13 May 2025 16:27), Max: 98.1 (13 May 2025 04:00)  HR: 78 (13 May 2025 16:27) (67 - 84)  BP: 145/80 (13 May 2025 16:27) (111/55 - 166/79)  RR: 18 (13 May 2025 16:27) (18 - 18)  SpO2: 98% (13 May 2025 16:27) (96% - 99%)    I&O's Summary      PHYSICAL EXAM:  GENERAL: NAD  HEAD:  Atraumatic, Normocephalic  NECK: Supple, No JVD  CHEST/LUNG: Clear to auscultation bilaterally; No rales, rhonchi, wheezing, or rubs  HEART: Regular rate and rhythm; S1/S2, No murmurs, rubs, or gallops  ABDOMEN: Soft, Nontender, Nondistended; Bowel sounds present x4 quadrants  VASCULAR: Normal pulses, Normal capillary refill  EXTREMITIES:  2+ Peripheral Pulses, No cyanosis, No edema  SKIN: Warm, Intact  NERVOUS SYSTEM:  AAOx3    LABS:                        12.9   6.97  )-----------( 242      ( 13 May 2025 06:35 )             39.5     05-13    143  |  110  |  15  ----------------------------<  90  4.7   |  23  |  0.7    Ca    8.8      13 May 2025 06:35  Mg     2.4     05-13    TPro  6.0  /  Alb  3.6  /  TBili  0.5  /  DBili  x   /  AST  15  /  ALT  16  /  AlkPhos  95  05-13      PT/INR - ( 12 May 2025 11:00 )   PT: 11.60 sec;   INR: 0.98 ratio         PTT - ( 12 May 2025 11:00 )  PTT:27.9 sec  Urinalysis Basic - ( 13 May 2025 06:35 )    Color: x / Appearance: x / SG: x / pH: x  Gluc: 90 mg/dL / Ketone: x  / Bili: x / Urobili: x   Blood: x / Protein: x / Nitrite: x   Leuk Esterase: x / RBC: x / WBC x   Sq Epi: x / Non Sq Epi: x / Bacteria: x        TSH 1.56   TSH with FT4 reflex --  Total T3 --            RADIOLOGY & ADDITIONAL TESTS:      Care Discussed with Consultants/Other Providers
HPI:  75F PMH of HTN & b/l knee pain 2/2 OA, presented for dizziness with standing since Monday 5/12 morning. Patient noticed episodes where it felt like everything was moving around her lasting a few seconds, relieved with rest. She endorses one episode of vomiting. She checked her BP at home both laying and sitting and was not orthostatic. Denies facial droop, double vision, difficulty swallowing, numbness or weakness in the legs aside from chronic orthopedic pains and sciatica.     In the ED on presentation: BP: 142/82. Glucose 158. CT HEAD: No acute intracranial pathology. CTA HEAD/NECK: No large vessel occlusion, stenosis, aneurysm, or vascular malformation. General neurology was consulted. 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     When seen by author, patient is asymptomatic resting in bed but has symptoms when she tries to sit up that last for less than a minute. Patient says her BP is usually well-controlled but in January after the steroid injection of the knees, her SBP was in the 160-180s only for a day. Patient denies any substance use. She works at a pharmaceutical company.    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    Mild chronic microvascular ischemic changes.    < end of copied text >        PAST MEDICAL & SURGICAL HISTORY:  Hypertension      Osteoarthritis          Hospital Course:    TODAY'S SUBJECTIVE & REVIEW OF SYMPTOMS:     Constitutional WNL   Cardio WNL   Resp WNL   GI WNL  Heme WNL  Endo WNL  Skin WNL  MSK WNL  Neuro vertigo   Cognitive WNL  Psych WNL      MEDICATIONS  (STANDING):  aspirin enteric coated 81 milliGRAM(s) Oral daily  atorvastatin 80 milliGRAM(s) Oral at bedtime  clopidogrel Tablet 75 milliGRAM(s) Oral daily  enoxaparin Injectable 40 milliGRAM(s) SubCutaneous every 24 hours  trimethoprim  160 mG/sulfamethoxazole 800 mG 1 Tablet(s) Oral once    MEDICATIONS  (PRN):  acetaminophen     Tablet .. 650 milliGRAM(s) Oral every 6 hours PRN Temp greater or equal to 38.5C (101.3F), Moderate Pain (4 - 6), Severe Pain (7 - 10)  meclizine 25 milliGRAM(s) Oral three times a day PRN Dizziness  melatonin 3 milliGRAM(s) Oral at bedtime PRN Insomnia  ondansetron    Tablet 4 milliGRAM(s) Oral three times a day PRN Nausea and/or Vomiting      FAMILY HISTORY:  FH: stroke (Mother)        Allergies    penicillins (Unknown)    Intolerances        SOCIAL HISTORY:    [  ] Etoh  [  ] Smoking  [  ] Substance abuse     Home Environment:  [   ] Home Alone  [ x  ] Lives with Family  [   ] Home Health Aid    Dwelling:  [   ] Apartment  [ x  ] Private House  [   ] Adult Home  [   ] Skilled Nursing Facility      [   ] Short Term  [   ] Long Term  [ x  ] Stairs       Elevator [   ]    FUNCTIONAL STATUS PTA: (Check all that apply)  Ambulation: [ x   ]Independent    [   ] Dependent     [   ] Non-Ambulatory  Assistive Device: [   ] SA Cane  [   ]  Q Cane  [   ] Walker  [   ]  Wheelchair  ADL : [  x ] Independent  [    ]  Dependent       Vital Signs Last 24 Hrs  T(C): 36.3 (13 May 2025 12:00), Max: 36.7 (13 May 2025 04:00)  T(F): 97.4 (13 May 2025 12:00), Max: 98.1 (13 May 2025 04:00)  HR: 67 (13 May 2025 12:00) (67 - 84)  BP: 111/55 (13 May 2025 12:00) (111/55 - 166/79)  BP(mean): 73 (13 May 2025 12:00) (73 - 124)  RR: 18 (13 May 2025 12:00) (18 - 18)  SpO2: 96% (13 May 2025 12:00) (96% - 99%)    Parameters below as of 13 May 2025 12:00  Patient On (Oxygen Delivery Method): room air          PHYSICAL EXAM: Awake & Alert  GENERAL: NAD  HEAD:  Normocephalic  CHEST/LUNG: Clear   HEART: S1S2+  ABDOMEN: Soft, Nontender  EXTREMITIES:  no calf tenderness    NERVOUS SYSTEM:  Cranial Nerves 2-12 intact [ x  ] Abnormal  [   ]  ROM: WFL all extremities [ x  ]  Abnormal [   ]  Motor Strength: WFL all extremities  [ x  ]  Abnormal [   ]  Sensation: intact to light touch [x   ] Abnormal [   ]    FUNCTIONAL STATUS:  Bed Mobility: Independent [   ]  Supervision [x   ]  Needs Assistance [   ]  N/A [   ]  Transfers: Independent [   ]  Supervision [ x  ]  Needs Assistance [   ]  N/A [   ]   Ambulation: Independent [   ]  Supervision [  x ]  Needs Assistance [   ]  N/A [   ]  ADL: Independent [   ] Requires Assistance [   ] N/A [   ]      LABS:                        12.9   6.97  )-----------( 242      ( 13 May 2025 06:35 )             39.5     05-13    143  |  110  |  15  ----------------------------<  90  4.7   |  23  |  0.7    Ca    8.8      13 May 2025 06:35  Mg     2.4     05-13    TPro  6.0  /  Alb  3.6  /  TBili  0.5  /  DBili  x   /  AST  15  /  ALT  16  /  AlkPhos  95  05-13    PT/INR - ( 12 May 2025 11:00 )   PT: 11.60 sec;   INR: 0.98 ratio         PTT - ( 12 May 2025 11:00 )  PTT:27.9 sec  Urinalysis Basic - ( 13 May 2025 06:35 )    Color: x / Appearance: x / SG: x / pH: x  Gluc: 90 mg/dL / Ketone: x  / Bili: x / Urobili: x   Blood: x / Protein: x / Nitrite: x   Leuk Esterase: x / RBC: x / WBC x   Sq Epi: x / Non Sq Epi: x / Bacteria: x        RADIOLOGY & ADDITIONAL STUDIES:  
Neurology Consult Note     GRETCHEN CORBIN 75y Female [id number]  Hospital Day:      HPI  75F. PMH: HTN  Presented for dizziness with standing this morning. Patient noticed episodes where it felt like everything was moving around her lasting a few seconds, relieved with rest. She endorses one episode of vomiting. She denies noticing any particular head motion associated. She checked her BP at home both laying and sitting and was not orthostatic. Denies facial droop, double vision, difficulty swallowing, numbness or weakness in the legs aside from chronic orthopedic pains and sciatica. Currently while resting in the bed, patient is asymptomatic but has symptoms when she tries to sit up that last for less than a minute.       PMH  Hypertension        Vital Signs  T(F): 97.9 (15:33), Max: 97.9 (15:33)  HR: 68 (15:33) (68 - 91)  BP: 119/69 (15:33) (119/69 - 142/82)  RR: 18 (15:33) (18 - 18)  SpO2: 98% (15:33) (98% - 100%)    Neurological Exam:   Mental status: Awake, alert and oriented to person, place, and time. Naming, repetition and comprehension intact.  Attention/concentration intact.  No dysarthria, no aphasia.  Fund of knowledge appropriate.    Cranial nerves:   - Eyes: PERRL, EOMI, left beating nystagmus lasting 5 seconds, Visual fields full   - Face: BL V1-V3 sensation intact symmetrically, face symmetric   - ENT: Hearing intact to voice, tongue was midline  Motor: No drift in all 4 extremities, 5/5 MALIK&LE. Normal tone and bulk.  No abnormal movements.    Sensation: Intact to light touch , temperature, vibration, proprioception, no extinction   Coordination: No dysmetria on finger-to-nose and heel-to-shin.  No dysdiadokinesia.  Reflexes: 2+ in bilateral UE/LE,   Gait: Deferred      Labs:  WBC 8.00 /HGB 13.7 /MCV 94.3 /HCT 41.2 / / 05-12 05-12    138  |  106  |  14  ----------------------------<  158[H]  4.9   |  22  |  0.6[L]    Ca    8.9      12 May 2025 11:00    TPro  6.2  /  Alb  4.1  /  TBili  0.7  /  DBili  x   /  AST  22  /  ALT  20  /  AlkPhos  98  05-12    LIVER FUNCTIONS - ( 12 May 2025 11:00 )  Alb: 4.1 g/dL / Pro: 6.2 g/dL / ALK PHOS: 98 U/L / ALT: 20 U/L / AST: 22 U/L / GGT: x           PT/INR - ( 12 May 2025 11:00 )   PT: 11.60 sec;   INR: 0.98 ratio         PTT - ( 12 May 2025 11:00 )  PTT:27.9 sec  Urinalysis Basic - ( 12 May 2025 11:00 )    Color: x / Appearance: x / SG: x / pH: x  Gluc: 158 mg/dL / Ketone: x  / Bili: x / Urobili: x   Blood: x / Protein: x / Nitrite: x   Leuk Esterase: x / RBC: x / WBC x   Sq Epi: x / Non Sq Epi: x / Bacteria: x        Medications:  amLODIPine   Tablet 2.5 milliGRAM(s) Oral daily  losartan 100 milliGRAM(s) Oral daily      Neuroimaging:  CT Angio Head w/ IV Cont:   ACC: [id number] EXAM:  CT ANGIO NECK (W)AW IC   ORDERED BY: CARLOS ALBERTO KURTZ     ACC: [id number] EXAM:  CT ANGIO BRAIN (W)AW IC   ORDERED BY: CARLOS ALBERTO KURTZ     ACC: [id number] EXAM:  CT BRAIN   ORDERED BY: CARLOS ALBERTO KURTZ     PROCEDURE DATE:  05/12/2025          INTERPRETATION:  CLINICAL INDICATION: Dizziness.    TECHNIQUE: CT of the head was performed with multiplanar reformats,   without IV contrast. CTA of the head and neck was performed after the   intravenous administration of 100 mL of Omnipaque 350 nonionicIV   contrast. 3-D and MIP reconstructions were performed and reviewed.    NASCET CRITERIA FOR CAROTID STENOSIS:  Mild: 0% to 49%, Moderate: 50% to 69%, Severe: 70% to 99%, Complete   Occlusion.    COMPARISON: None available.    FINDINGS:    CT HEAD:    No acute transcortical infarction or acute intracranial hemorrhage.    No hydrocephalus. No extra-axial fluid collections.    The visualized intraorbital contents are normal. The imaged portions of   the paranasal sinuses are clear. The mastoid air cells are clear. The   visualized soft tissues and osseous structures appear normal.      CTA HEAD/NECK:    AORTIC ARCH: Mild atherosclerotic plaques without flow-limiting stenosis.    RIGHT ANTERIOR CIRCULATION:  Common carotid artery: No stenosis.  External carotid artery: No stenosis.  Internal carotid artery:  -Extracranial: No stenosis.  -Intracranial: Mild calcific atherosclerosis in the carotid siphon   without stenosis.    Anterior cerebral artery: No stenosis.  Middle cerebral artery: No stenosis.      LEFT ANTERIOR CIRCULATION:  Common carotid artery: No stenosis.  External carotid artery: No stenosis.  Internal carotid artery:  -Extracranial: No stenosis.  -Intracranial: Mild calcific atherosclerosis in the carotid siphon   without stenosis.    Anterior cerebral artery: No stenosis.  Middle cerebral artery: No stenosis.      POSTERIOR CIRCULATION:  Right vertebral artery: No stenosis.  Left vertebral artery: No stenosis.  Basilar artery: No stenosis.  Proximal cerebellar arteries: No stenosis.    Posterior cerebral arteries: No stenosis.      Dural venous sinuses or deep cerebral veins:  No evidence of dural sinus thrombosis.      IMPRESSION:    CT HEAD:  No acute intracranial pathology.    CTA HEAD/NECK:  No large vessel occlusion, stenosis, aneurysm, or vascular malformation.    --- End of Report ---            INOCENTE SAMUELS MD; Attending Radiologist  This document has been electronically signed. May 12 2025 11:57AM (05-12-25 @ 11:22)  CT Head No Cont:   ACC: [id number] EXAM:  CT ANGIO NECK (W)AW IC   ORDERED BY: CARLOS ALBERTO KURTZ     ACC: [id number] EXAM:  CT ANGIO BRAIN (W)AW IC   ORDERED BY: CARLOS ALBERTO KURTZ     ACC: [id number] EXAM:  CT BRAIN   ORDERED BY: CARLOS ALBERTO KURTZ     PROCEDURE DATE:  05/12/2025          INTERPRETATION:  CLINICAL INDICATION: Dizziness.    TECHNIQUE: CT of the head was performed with multiplanar reformats,   without IV contrast. CTA of the head and neck was performed after the   intravenous administration of 100 mL of Omnipaque 350 nonionicIV   contrast. 3-D and MIP reconstructions were performed and reviewed.    NASCET CRITERIA FOR CAROTID STENOSIS:  Mild: 0% to 49%, Moderate: 50% to 69%, Severe: 70% to 99%, Complete   Occlusion.    COMPARISON: None available.    FINDINGS:    CT HEAD:    No acute transcortical infarction or acute intracranial hemorrhage.    No hydrocephalus. No extra-axial fluid collections.    The visualized intraorbital contents are normal. The imaged portions of   the paranasal sinuses are clear. The mastoid air cells are clear. The   visualized soft tissues and osseous structures appear normal.      CTA HEAD/NECK:    AORTIC ARCH: Mild atherosclerotic plaques without flow-limiting stenosis.    RIGHT ANTERIOR CIRCULATION:  Common carotid artery: No stenosis.  External carotid artery: No stenosis.  Internal carotid artery:  -Extracranial: No stenosis.  -Intracranial: Mild calcific atherosclerosis in the carotid siphon   without stenosis.    Anterior cerebral artery: No stenosis.  Middle cerebral artery: No stenosis.      LEFT ANTERIOR CIRCULATION:  Common carotid artery: No stenosis.  External carotid artery: No stenosis.  Internal carotid artery:  -Extracranial: No stenosis.  -Intracranial: Mild calcific atherosclerosis in the carotid siphon   without stenosis.    Anterior cerebral artery: No stenosis.  Middle cerebral artery: No stenosis.      POSTERIOR CIRCULATION:  Right vertebral artery: No stenosis.  Left vertebral artery: No stenosis.  Basilar artery: No stenosis.  Proximal cerebellar arteries: No stenosis.    Posterior cerebral arteries: No stenosis.      Dural venous sinuses or deep cerebral veins:  No evidence of dural sinus thrombosis.      IMPRESSION:    CT HEAD:  No acute intracranial pathology.    CTA HEAD/NECK:  No large vessel occlusion, stenosis, aneurysm, or vascular malformation.    --- End of Report ---            INOCENTE SAMUELS MD; Attending Radiologist  This document has been electronically signed. May 12 2025 11:57AM (05-12-25 @ 11:12)  
Patient is a 75y old  Female who presents with a chief complaint of     HPI:  75F PMH of HTN & b/l knee pain 2/2 OA, presented for dizziness with standing since Monday 5/12 morning. Patient noticed episodes where it felt like everything was moving around her lasting a few seconds, relieved with rest. She endorses one episode of vomiting. She checked her BP at home both laying and sitting and was not orthostatic. Denies facial droop, double vision, difficulty swallowing, numbness or weakness in the legs aside from chronic orthopedic pains and sciatica.     In the ED on presentation: BP: 142/82. Glucose 158. CT HEAD: No acute intracranial pathology. CTA HEAD/NECK: No large vessel occlusion, stenosis, aneurysm, or vascular malformation. General neurology was consulted. 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     EP:  EP was consulted for loop recorder    PAST MEDICAL & SURGICAL HISTORY:  Hypertension      Osteoarthritis      PREVIOUS DIAGNOSTIC TESTING:      ECHO  FINDINGS:  < from: TTE Echo Complete w/o Contrast w/ Doppler (05.13.25 @ 08:14) >  Summary:   1. Left ventricular ejection fraction, by visual estimation, is 60 to   65%.   2. Normal global left ventricular systolic function.   3. Normal right ventricular size and function.   4. Mildly enlarged left atrium.   5. Normal right atrial size.   6. Trace mitral valve regurgitation.   7. Color flow doppler and intravenous injection of agitated saline   demonstrates the presence of an intact intra atrial septum.    < end of copied text >    MEDICATIONS  (STANDING):  aspirin enteric coated 81 milliGRAM(s) Oral daily  atorvastatin 80 milliGRAM(s) Oral at bedtime  clopidogrel Tablet 75 milliGRAM(s) Oral daily  enoxaparin Injectable 40 milliGRAM(s) SubCutaneous every 24 hours    MEDICATIONS  (PRN):  acetaminophen     Tablet .. 650 milliGRAM(s) Oral every 6 hours PRN Temp greater or equal to 38.5C (101.3F), Moderate Pain (4 - 6), Severe Pain (7 - 10)  meclizine 25 milliGRAM(s) Oral three times a day PRN Dizziness  melatonin 3 milliGRAM(s) Oral at bedtime PRN Insomnia  ondansetron    Tablet 4 milliGRAM(s) Oral three times a day PRN Nausea and/or Vomiting      FAMILY HISTORY:  FH: stroke (Mother)    SOCIAL HISTORY:  none  CIGARETTES:  none  ALCOHOL:  none  Past Surgical History:    Allergies:    penicillins (Unknown)      REVIEW OF SYSTEMS:    CONSTITUTIONAL: No fever, weight loss, chills, shakes, or fatigue  EYES: No eye pain, visual disturbances, or discharge  ENMT:  No difficulty hearing, tinnitus, vertigo; No sinus or throat pain  NECK: No pain or stiffness  BREASTS: No pain, masses, or nipple discharge  RESPIRATORY: No cough, wheezing, hemoptysis, or shortness of breath  CARDIOVASCULAR: No chest pain, dyspnea, palpitations, dizziness, syncope, paroxysmal nocturnal dyspnea, orthopnea, or arm or leg swelling  GASTROINTESTINAL: No abdominal  or epigastric pain, nausea, vomiting, hematemesis, diarrhea, constipation, melena or bright red blood.  GENITOURINARY: No dysuria, nocturia, hematuria, or urinary incontinence  NEUROLOGICAL: No headaches, memory loss, slurred speech, limb weakness, loss of strength, numbness, or tremors  SKIN: No itching, burning, rashes, or lesions   LYMPH NODES: No enlarged glands  ENDOCRINE: No heat or cold intolerance, or hair loss  MUSCULOSKELETAL: No joint pain or swelling, muscle, back, or extremity pain  PSYCHIATRIC: No depression, anxiety, or difficulty sleeping  HEME/LYMPH: No easy bruising or bleeding gums  ALLERY AND IMMUNOLOGIC: No hives or rash.      Vital Signs Last 24 Hrs  T(C): 36.3 (13 May 2025 12:00), Max: 36.7 (13 May 2025 04:00)  T(F): 97.4 (13 May 2025 12:00), Max: 98.1 (13 May 2025 04:00)  HR: 67 (13 May 2025 12:00) (67 - 84)  BP: 111/55 (13 May 2025 12:00) (111/55 - 166/79)  BP(mean): 73 (13 May 2025 12:00) (73 - 124)  RR: 18 (13 May 2025 12:00) (18 - 18)  SpO2: 96% (13 May 2025 12:00) (96% - 99%)    Parameters below as of 13 May 2025 12:00  Patient On (Oxygen Delivery Method): room air        PHYSICAL EXAM:    GENERAL: In no apparent distress, well nourished, and hydrated.  HEART: Regular rate and rhythm; No murmurs, rubs, or gallops.  PULMONARY: Clear to auscultation and perfusion.  No rales, wheezing, or rhonchi bilaterally.  ABDOMEN: Soft, Nontender, Nondistended; Bowel sounds present  EXTREMITIES:  2+ Peripheral Pulses, No clubbing, cyanosis, or edema  NEUROLOGICAL: Grossly nonfocal    INTERPRETATION OF TELEMETRY:  NSR    ECG:  < from: 12 Lead ECG (05.12.25 @ 12:02) >  Ventricular Rate 74 BPM    Atrial Rate 74 BPM    P-R Interval 204 ms    QRS Duration 102 ms    Q-T Interval 428 ms    QTC Calculation(Bazett) 475 ms    P Axis 46 degrees    R Axis -30 degrees    T Axis 12 degrees    Diagnosis Line Normal sinus rhythm  Left axis deviation  Incomplete right bundle branch block  Possible Anterior infarct , age undetermined  Abnormal ECG    < end of copied text >      LABS:                        12.9   6.97  )-----------( 242      ( 13 May 2025 06:35 )             39.5     05-13    143  |  110  |  15  ----------------------------<  90  4.7   |  23  |  0.7    Ca    8.8      13 May 2025 06:35  Mg     2.4     05-13    TPro  6.0  /  Alb  3.6  /  TBili  0.5  /  DBili  x   /  AST  15  /  ALT  16  /  AlkPhos  95  05-13        PT/INR - ( 12 May 2025 11:00 )   PT: 11.60 sec;   INR: 0.98 ratio         PTT - ( 12 May 2025 11:00 )  PTT:27.9 sec  Urinalysis Basic - ( 13 May 2025 06:35 )    Color: x / Appearance: x / SG: x / pH: x  Gluc: 90 mg/dL / Ketone: x  / Bili: x / Urobili: x   Blood: x / Protein: x / Nitrite: x   Leuk Esterase: x / RBC: x / WBC x   Sq Epi: x / Non Sq Epi: x / Bacteria: x

## 2025-05-13 NOTE — ED CDU PROVIDER SUBSEQUENT DAY NOTE - HISTORY
FF: I discussed mri results with neuro. reports can admit pt to stroke unit under dr. lennon and load pt with plavix.

## 2025-05-13 NOTE — DISCHARGE NOTE PROVIDER - HOSPITAL COURSE
Hospital course:  75F w/ PMHx of HTN & b/l knee pain 2/2 OA, presented for episodic dizziness with position changes since Monday 5/12 morning. Exam shows unidirectional transient nystagmus. NIHSS 0. CTH/CTA unremarkable. MR head with punctate acute infarct in the left body of the corpus callosum. Patient was admitted for further work up for her acute infarct. PT/OT/Physiatry recommended home. Patient currently reports slight dizziness but improved from yesterday and has no signs of nystagmus, dysmetria, or ataxia or any other focal neurologic deficits on exam. Patient is now ready for discharge.    Discharge Diagnosis  Acute punctate L corpus callosum infarct likely 2/2 small vessel disease vs ESUS    Patient had the following workup done in house:  [x] CTH: neg  [x] CTA H/N: neg  [x] MR Head: punctate acute infarct in the left body of the corpus callosum  [ ] TTE w/bubble:  [ ] Core Measures:    A1c   TSH    Physical exam at discharge:  <<<<<NEURO EXAM>>>>>  General: Appearance is consistent with chronologic age. No abnormal facies.  Cognitive/Language: The patient is oriented to person, place, time and date. Recent and remote memory intact. Language with normal repetition, comprehension and naming. Nondysarthric.    Eyes: VFF. EOMI w/o nystagmus or reported double vision. PERRL. No ptosis/weakness of eyelid closure.    Face: Facial sensation normal V1 - 3, no facial asymmetry.    Ears/Nose/Throat: Hearing grossly intact b/l. Palate elevates midline. Tongue and uvula midline.   Motor examination: Normal tone. No tremors or involuntary movements.  Strength Exam (MRC scale): 5/5 BL UE and LE strength  Reflexes: 2+ b/l biceps, triceps, brachioradialis, patellar and Achilles reflexes. Plantar response downgoing b/l.  Sensory examination: Intact to light touch, temperature, and vibration in all extremities.  Cerebellum: FTN/HKS intact. No dysmetria or dysdiadochokinesia.    Gait: Wide based but steady, able to tiptoe and heel walk, negative Romberg    New medications on discharge: ASA 81 mg daily and plavix 75 mg daily for 21 days (Last day 6/1), then ASA 81 mg daily only, Atorvastatin 80 mg nightly  Labs to be followed up: TSH, A1c  Imaging to be done as outpatient: None  Further outpatient workup: F/u w/stroke clinic within 2-4 weeks, f/u with cardiac electrophysiology within 4 weeks   Hospital course:  75F w/ PMHx of HTN & b/l knee pain 2/2 OA, presented for episodic dizziness with position changes since  morning. Exam shows unidirectional transient nystagmus. NIHSS 0. CTH/CTA unremarkable. MR head with punctate acute infarct in the left body of the corpus callosum. Patient was admitted for further work up for her acute infarct. PT/OT/Physiatry recommended home. Patient currently reports slight dizziness but improved from yesterday and has no signs of nystagmus, dysmetria, or ataxia or any other focal neurologic deficits on exam. Patient is now ready for discharge.    Discharge Diagnosis  Acute punctate L corpus callosum infarct likely 2/2 small vessel disease vs ESUS    Patient had the following workup done in house:  [x] CTH: neg  [x] CTA H/N: neg  [x] MR Head: punctate acute infarct in the left body of the corpus callosum  [ ] TTE w/bubble:  [ ] Core Measures:    A1c   TSH    Physical exam at discharge:  <<<<<NEURO EXAM>>>>>  General: Appearance is consistent with chronologic age. No abnormal facies.  Cognitive/Language: The patient is oriented to person, place, time and date. Recent and remote memory intact. Language with normal repetition, comprehension and naming. Nondysarthric.    Eyes: VFF. EOMI w/o nystagmus or reported double vision. PERRL. No ptosis/weakness of eyelid closure.    Face: Facial sensation normal V1 - 3, no facial asymmetry.    Ears/Nose/Throat: Hearing grossly intact b/l. Palate elevates midline. Tongue and uvula midline.   Motor examination: Normal tone. No tremors or involuntary movements.  Strength Exam (MRC scale): 5/5 BL UE and LE strength  Reflexes: 2+ b/l biceps, triceps, brachioradialis, patellar and Achilles reflexes. Plantar response downgoing b/l.  Sensory examination: Intact to light touch, temperature, and vibration in all extremities.  Cerebellum: FTN/HKS intact. No dysmetria or dysdiadochokinesia.    Gait: Wide based but steady, able to tiptoe and heel walk, negative Romberg    New medications on discharge: ASA 81 mg daily and plavix 75 mg daily for 21 days (Last day ), then ASA 81 mg daily only, Atorvastatin 80 mg nightly  Labs to be followed up: TSH, A1c  Imaging to be done as outpatient: None  Further outpatient workup: F/u w/stroke clinic within 2-4 weeks, f/u with cardiac electrophysiology within 4 weeks      Stroke attending attestation:  Pt is a 76 yo F with PMHx of HTN, who presented with sudden onset vertigo/imbalance. NIHSS 0, symptoms much improved today.    Impr: punctate acute ischemic stroke in left corpus callosum  CTA head/neck without flow limiting stenosis  TTE with EF 60-65%, -shunt  Etiology possible ESUS vs   ILR placement  PTA: ASA-> DAPT x 21 days then resume ASA monotherapy   , high intensity statin  D/c home with outpt therapy, f/u in stroke clinic Hospital course:  75F w/ PMHx of HTN & b/l knee pain 2/2 OA, presented for episodic dizziness with position changes since  morning. Exam shows unidirectional transient nystagmus. NIHSS 0. CTH/CTA unremarkable. MR head with punctate acute infarct in the left body of the corpus callosum. Patient was admitted for further work up for her acute infarct. PT/OT/Physiatry recommended home. Patient currently reports slight dizziness but improved from yesterday and has no signs of nystagmus, dysmetria, or ataxia or any other focal neurologic deficits on exam. Patient is now ready for discharge.    Discharge Diagnosis  Acute punctate L corpus callosum infarct likely 2/2 small vessel disease vs ESUS    Patient had the following workup done in house:  [x] CTH: neg  [x] CTA H/N: neg  [x] MR Head: punctate acute infarct in the left body of the corpus callosum  [x] TTE w/bubble: EF 60-65%, no PFO  [x] Core Measures: LDL-140   A1c -5.6  TSH - 1.56    Physical exam at discharge:  <<<<<NEURO EXAM>>>>>  General: Appearance is consistent with chronologic age. No abnormal facies.  Cognitive/Language: The patient is oriented to person, place, time and date. Recent and remote memory intact. Language with normal repetition, comprehension and naming. Nondysarthric.    Eyes: VFF. EOMI w/o nystagmus or reported double vision. PERRL. No ptosis/weakness of eyelid closure.    Face: Facial sensation normal V1 - 3, no facial asymmetry.    Ears/Nose/Throat: Hearing grossly intact b/l. Palate elevates midline. Tongue and uvula midline.   Motor examination: Normal tone. No tremors or involuntary movements.  Strength Exam (MRC scale): 5/5 BL UE and LE strength  Reflexes: 2+ b/l biceps, triceps, brachioradialis, patellar and Achilles reflexes. Plantar response downgoing b/l.  Sensory examination: Intact to light touch, temperature, and vibration in all extremities.  Cerebellum: FTN/HKS intact. No dysmetria or dysdiadochokinesia.    Gait: Wide based but steady, able to tiptoe and heel walk, negative Romberg    New medications on discharge: ASA 81 mg daily and plavix 75 mg daily for 21 days (Last day ), then ASA 81 mg daily only, Atorvastatin 80 mg nightly  Labs to be followed up: TSH, A1c  Imaging to be done as outpatient: None  Further outpatient workup: F/u w/stroke clinic within 2-4 weeks, f/u with cardiac electrophysiology within 4 weeks      Stroke attending attestation:  Pt is a 76 yo F with PMHx of HTN, who presented with sudden onset vertigo/imbalance. NIHSS 0, symptoms much improved today.    Impr: punctate acute ischemic stroke in left corpus callosum  CTA head/neck without flow limiting stenosis  TTE with EF 60-65%, -shunt  Etiology possible ESUS vs   ILR placement  PTA: ASA-> DAPT x 21 days then resume ASA monotherapy   , high intensity statin  D/c home with outpt therapy, f/u in stroke clinic   Hospital course:  75F w/ PMHx of HTN & b/l knee pain 2/2 OA, presented for episodic dizziness with position changes since  morning. Exam shows unidirectional transient nystagmus. NIHSS 0. CTH/CTA unremarkable. MR head with punctate acute infarct in the left body of the corpus callosum. Patient was admitted for further work up for her acute infarct. PT/OT/Physiatry recommended home. Patient currently reports slight dizziness but improved from yesterday and has no signs of nystagmus, dysmetria, or ataxia or any other focal neurologic deficits on exam. Patient is now ready for discharge.    Discharge Diagnosis  Acute punctate L corpus callosum infarct likely 2/2 small vessel disease vs ESUS    Patient had the following workup done in house:  [x] CTH: neg  [x] CTA H/N: neg  [x] MR Head: punctate acute infarct in the left body of the corpus callosum  [x] TTE w/bubble: EF 60-65%, no PFO  [x] Core Measures: LDL-140   A1c -5.6  TSH - 1.56  [x] EP - ILR placement  [x] PT/OT - OP PT/OT w RW    Physical exam at discharge:  <<<<<NEURO EXAM>>>>>  General: Appearance is consistent with chronologic age. No abnormal facies.  Cognitive/Language: The patient is oriented to person, place, time and date. Recent and remote memory intact. Language with normal repetition, comprehension and naming. Nondysarthric.    Eyes: VFF. EOMI w/o nystagmus or reported double vision. PERRL. No ptosis/weakness of eyelid closure.    Face: Facial sensation normal V1 - 3, no facial asymmetry.    Ears/Nose/Throat: Hearing grossly intact b/l. Palate elevates midline. Tongue and uvula midline.   Motor examination: Normal tone. No tremors or involuntary movements.  Strength Exam (MRC scale): 5/5 BL UE and LE strength  Reflexes: 2+ b/l biceps, triceps, brachioradialis, patellar and Achilles reflexes. Plantar response downgoing b/l.  Sensory examination: Intact to light touch, temperature, and vibration in all extremities.  Cerebellum: FTN/HKS intact. No dysmetria or dysdiadochokinesia.    Gait: Wide based but steady, able to tiptoe and heel walk, negative Romberg    New medications on discharge: ASA 81 mg daily and plavix 75 mg daily for 21 days (Last day ), then ASA 81 mg daily only, Atorvastatin 80 mg nightly  Labs to be followed up: TSH, A1c  Imaging to be done as outpatient: None  Further outpatient workup: F/u w/stroke clinic within 2-4 weeks, f/u with cardiac electrophysiology within 4 weeks      Stroke attending attestation:  Pt is a 74 yo F with PMHx of HTN, who presented with sudden onset vertigo/imbalance. NIHSS 0, symptoms much improved today.    Impr: punctate acute ischemic stroke in left corpus callosum  CTA head/neck without flow limiting stenosis  TTE with EF 60-65%, -shunt  Etiology possible ESUS vs   ILR placement  PTA: ASA-> DAPT x 21 days then resume ASA monotherapy   , high intensity statin  D/c home with outpt therapy, f/u in stroke clinic

## 2025-05-13 NOTE — DISCHARGE NOTE NURSING/CASE MANAGEMENT/SOCIAL WORK - PATIENT PORTAL LINK FT
You can access the FollowMyHealth Patient Portal offered by [facility] by registering at the following website: [email or web address]. By joining [facility] FollowMyHealth portal, you will also be able to view your health information using other applications (apps) compatible with our system.

## 2025-05-13 NOTE — CONSULT NOTE ADULT - ASSESSMENT
IMPRESSION: Rehab of L acute stroke / vertigo /  HTN & b/l knee pain 2/2 OA    PRECAUTIONS: [   ] Cardiac  [   ] Respiratory  [   ] Seizures [   ] Contact Isolation  [   ] Droplet Isolation  [   ] Other    Weight Bearing Status:     RECOMMENDATION:    Out of Bed to Chair     DVT/Decubiti Prophylaxis    REHAB PLAN:     [  x  ] Bedside P/T 3-5 times a week   [   x ]   Bedside O/T  2-3 times a week             [    ] Speech Therapy               [    ]  No Rehab Therapy Indicated   Conditioning/ROM                                    ADL  Bed Mobility                                               Conditioning/ROM  Transfers                                                     Bed Mobility  Sitting /Standing Balance                         Transfers                                        Gait Training                                               Sitting/Standing Balance  Stair Training [   ]Applicable                    Home equipment Eval                                                                        Splinting  [   ] Only      GOALS:   ADL   [  x  ]   Independent                    Transfers  [ x   ] Independent                          Ambulation  [  x  ] Independent     [   x  ] With device                            [    ]  CG                                                         [    ]  CG                                                                  [    ] CG                            [    ] Min A                                                   [    ] Min A                                                              [    ] Min  A          DISCHARGE PLAN:   [    ]  Good candidate for Intensive Rehabilitation/Hospital based                                             Will tolerate 3hrs Intensive Rehab Daily                                       [     ]  Short Term Rehab in Skilled Nursing Facility                                       [  x   ]  Home with Outpatient or VN services                                         [     ]  Possible Candidate for Intensive Hospital based Rehab

## 2025-05-13 NOTE — SPEECH LANGUAGE PATHOLOGY EVALUATION - COMMENTS
Patient would like a call back from Nurse, she stated that her face where the cyst is swollen and she is in some pain. Please give patient a call.    dc slp functional functional skills #punctate acute infarct of left body of corpus callosum. Etiology likely , less likely embolic. functional motor speech skills no suspected cog deficits

## 2025-05-13 NOTE — OCCUPATIONAL THERAPY INITIAL EVALUATION ADULT - LIVES WITH, PROFILE
Pt lives with spouse in a  with 5 steps(BHRs) to enter and another 12 steps(up with left HR) to bedroom/bathroom. +bath tub with no grab bars and no shower chair/spouse

## 2025-05-13 NOTE — OCCUPATIONAL THERAPY INITIAL EVALUATION ADULT - PERTINENT HX OF CURRENT PROBLEM, REHAB EVAL
Pt is a 75F PMH of HTN & b/l knee pain 2/2 OA, presented for dizziness with standing since Monday 5/12 morning. Patient noticed episodes where it felt like everything was moving around her lasting a few seconds, relieved with rest. She endorses one episode of vomiting. She checked her BP at home both laying and sitting and was not orthostatic. Denies facial droop, double vision, difficulty swallowing, numbness or weakness in the legs aside from chronic orthopedic pains and sciatica.     In the ED on presentation: BP: 142/82. Glucose 158. CT HEAD: No acute intracranial pathology. CTA HEAD/NECK: No large vessel occlusion, stenosis, aneurysm, or vascular malformation. General neurology was consulted. 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     When seen by author, patient is asymptomatic resting in bed but has symptoms when she tries to sit up that last for less than a minute. Patient says her BP is usually well-controlled but in January after the steroid injection of the knees, her SBP was in the 160-180s only for a day. Patient denies any substance use. She works at a pharmaceutical company.

## 2025-05-13 NOTE — OCCUPATIONAL THERAPY INITIAL EVALUATION ADULT - ADL RETRAINING, OT EVAL
Pt will perform UB dressing task with independent by dc. Pt will perform LB dressing task with independent by dc.

## 2025-05-13 NOTE — PHYSICAL THERAPY INITIAL EVALUATION ADULT - ADDITIONAL COMMENTS
Pt lives with spouse in pvt home, has steps to enter, 1 flight of steps inside. Pt was independent prior to admission. Pt was receiving services for outpt PT for her knees. + drives, + active. As per daughter sometimes goes up stairs "on all 4s" when her knees are bothering her.

## 2025-05-13 NOTE — OCCUPATIONAL THERAPY INITIAL EVALUATION ADULT - TRANSFER TRAINING, PT EVAL
Pt will perform sit<>stand transfer task with independent by dc. Pt will perform bed<>chair transfer task with independent by dc.

## 2025-05-13 NOTE — DISCHARGE NOTE PROVIDER - NSDCFUADDAPPT_GEN_ALL_CORE_FT
Please follow up with your primary care physician within 2 weeks to discuss restarting your losartan and amlodipine.    Someone will call you for the appointment in the stroke unit. IF no one calls my 5/15, please call Dr. Mejía's office number to set up an appointment.

## 2025-05-13 NOTE — CONSULT NOTE ADULT - ASSESSMENT
75F PMH of HTN & b/l knee pain 2/2 OA, presented for dizziness with standing since Monday 5/12 morning.  Imagining confirmed CVA.    Plan  Recommend long term cardiac monitoring with loop recorder  Pt and dtr (physician) both agree  Due to schedule, likely won't be able to implant loop today

## 2025-05-13 NOTE — H&P ADULT - NSHPPHYSICALEXAM_GEN_ALL_CORE
Mental status: Awake, alert and oriented x3.  Recent and remote memory intact.  No dysarthria, no aphasia.    Cranial nerves: Pupils equally round and reactive to light, visual fields full, left beating transient nystagmus, extraocular muscles intact, V1 through V3 intact bilaterally and symmetric, face symmetric, hearing intact to finger rub, palate elevation symmetric, tongue was midline.  Motor:  5/5 b/l UE/LE.   strength 5/5.  Normal tone and bulk. No abnormal movements.    Sensation: Intact to light touch, proprioception, and pinprick.  Coordination: No dysmetria on finger-to-nose and heel-to-shin.  No dysdiadochokinesia.  Reflexes: 2+ in bilateral UE/LE, downgoing toes bilaterally.  Gait: narrow based and steady. Romberg negative.\    NIHSS: 0 Mental status: Awake, alert and oriented x3.  Recent and remote memory intact.  No dysarthria, no aphasia.    Cranial nerves: Pupils equally round and reactive to light, visual fields full, left beating transient nystagmus, extraocular muscles intact, V1 through V3 intact bilaterally and symmetric, face symmetric, hearing intact to finger rub, palate elevation symmetric, tongue was midline.  Motor:  5/5 b/l UE/LE.   strength 5/5.  Normal tone and bulk. No abnormal movements.    Sensation: Intact to light touch, proprioception, and pinprick.  Coordination: No dysmetria on finger-to-nose and heel-to-shin.  No dysdiadochokinesia.  Reflexes: 2+ in bilateral UE, 1+ b/l LE, downgoing toes bilaterally.  Gait: deferred due to patient safety.    NIHSS: 0

## 2025-05-13 NOTE — PHYSICAL THERAPY INITIAL EVALUATION ADULT - PERTINENT HX OF CURRENT PROBLEM, REHAB EVAL
Pt is a75F PMH of HTN & b/l knee pain 2/2 OA, presented for dizziness with standing since Monday 5/12 morning. Patient noticed episodes where it felt like everything was moving around her lasting a few seconds, relieved with rest. She endorses one episode of vomiting. She checked her BP at home both laying and sitting and was not orthostatic. Denies facial droop, double vision, difficulty swallowing, numbness or weakness in the legs aside from chronic orthopedic pains and sciatica.     In the ED on presentation: BP: 142/82. Glucose 158. CT HEAD: No acute intracranial pathology. CTA HEAD/NECK: No large vessel occlusion, stenosis, aneurysm, or vascular malformation. General neurology was consulted. 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

## 2025-05-13 NOTE — CONSULT NOTE ADULT - ASSESSMENT
75F w/ PMHx of HTN & b/l knee pain 2/2 OA, presented for episodic dizziness with position changes since Monday 5/12 morning. Exam shows unidirectional transient nystagmus. NIHSS 0. CTH/CTA unremarkable. 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 Patient admitted to stroke service for monitoring. Medicine consulted for comanagement.         Problem list:  #Acute CVA   #Vertigo  #HTN/HLD  #Hx OA        Plan:  Management per Neurology  All imaging, labs, and vitals personally reviewed   - PT/OT/PMR/OOBTC/AAT; OT requested to preform Janine-Hallpike maneuver   - Fall and asp precautions   - Multimodal pain regimen   - Bowel regimen PRN / stool count   - Monitor H&H and for signs of bleeding   - Monitor for fever and WBC trend  - Incentive spirometer 10x/hr while awake  - Continuous tele monitoring  - NC q4 / c/w DAPT / Lipitor 80mg qhs   - f/u TTE w/ bubble study   - f/u TSH/Lipid panel/A1c  - Meclizine 25mg TID   - Zofran PRN   - hold home antihypertensives for now   - ILR placement with EP  - CHG, DVT PPx       If any questions, please send a message or call on [employer] Teams.    Management per Neurology, Medicine for co-management.    Total time spent to complete patient's bedside assessment, reviewed medical chart, discussed medical plan of care with team was more than 60 minutes with >50% of time spent face to face with patient, discussion with patient/family and/or coordination of care.

## 2025-05-13 NOTE — H&P ADULT - ATTENDING COMMENTS
Pt is a 74 yo F with PMHx of HTN, who presented with sudden onset vertigo/imbalance. NIHSS 0, symptoms much improved today.    Impr: punctate acute ischemic stroke in left corpus callosum  CTA head/neck without flow limiting stenosis  TTE with EF 60-65%, -shunt  Etiology possible ESUS vs   ILR placement  PTA: ASA-> DAPT x 21 days then resume ASA monotherapy   , high intensity statin  PT/OT/ST, tele, dispo- home with outpt therapy

## 2025-05-13 NOTE — H&P ADULT - NSHPLABSRESULTS_GEN_ALL_CORE
LABS:                         13.7   8.00  )-----------( 241      ( 12 May 2025 11:00 )             41.2     05-12    138  |  106  |  14  ----------------------------<  158[H]  4.9   |  22  |  0.6[L]    Ca    8.9      12 May 2025 11:00    TPro  6.2  /  Alb  4.1  /  TBili  0.7  /  DBili  x   /  AST  22  /  ALT  20  /  AlkPhos  98  05-12    PT/INR - ( 12 May 2025 11:00 )   PT: 11.60 sec;   INR: 0.98 ratio    PTT - ( 12 May 2025 11:00 )  PTT:27.9 sec        RADIOLOGY, EKG & ADDITIONAL TESTS:    CT HEAD:  No acute intracranial pathology.    CTA HEAD/NECK:  No large vessel occlusion, stenosis, aneurysm, or vascular malformation.    MR Head No Cont:  Punctate acute infarct in the left body of the corpus callosum.  Mild chronic microvascular ischemic changes.

## 2025-05-13 NOTE — PATIENT PROFILE ADULT - FALL HARM RISK - HARM RISK INTERVENTIONS

## 2025-05-13 NOTE — OCCUPATIONAL THERAPY INITIAL EVALUATION ADULT - FINE MOTOR COORDINATION, LEFT HAND THUMB/FINGER OPPOSITION SKILLS, OT EVAL
Anemia    COPD (chronic obstructive pulmonary disease)    Dementia    DM (diabetes mellitus)    DVT (deep venous thrombosis)    HTN (hypertension)    Osteoarthritis    Osteomyelitis of left leg    PVD (peripheral vascular disease)
normal performance

## 2025-05-13 NOTE — DISCHARGE NOTE PROVIDER - NSDCMRMEDTOKEN_GEN_ALL_CORE_FT
amLODIPine 2.5 mg oral tablet: 1 tab(s) orally once a day  aspirin 81 mg oral delayed release tablet: 1 tab(s) orally once a day Please take Aspirin 81 mg daily and Clopidogrel 81 mg daily until 6/1 and then take aspirin 81 mg daily until instructed to stop  atorvastatin 80 mg oral tablet: 1 tab(s) orally once a day (at bedtime)  clopidogrel 75 mg oral tablet: 1 tab(s) orally once a day Please take Aspirin 81 mg daily and Clopidogrel 81 mg daily until 6/1 and then take aspirin 81 mg daily until instructed to stop  losartan 100 mg oral tablet: 1 tab(s) orally once a day  meclizine 25 mg oral tablet: 1 tab(s) orally 3 times a day as needed for Dizziness Take for vertigo/dizziness not subsiding as a symptomatic relief   aspirin 81 mg oral delayed release tablet: 1 tab(s) orally once a day Please take Aspirin 81 mg daily and Clopidogrel 81 mg daily until 6/1 and then take aspirin 81 mg daily until instructed to stop  atorvastatin 80 mg oral tablet: 1 tab(s) orally once a day (at bedtime)  clopidogrel 75 mg oral tablet: 1 tab(s) orally once a day Please take Aspirin 81 mg daily and Clopidogrel 81 mg daily until 6/1 and then take aspirin 81 mg daily until instructed to stop  meclizine 25 mg oral tablet: 1 tab(s) orally 3 times a day as needed for Dizziness Take for vertigo/dizziness not subsiding as a symptomatic relief

## 2025-05-13 NOTE — SWALLOW BEDSIDE ASSESSMENT ADULT - SLP PERTINENT HISTORY OF CURRENT PROBLEM
75F w/ PMHx of HTN & b/l knee pain 2/2 OA, presented for episodic dizziness with position changes since Monday 5/12 morning. Exam shows unidirectional transient nystagmus. NIHSS 0. CTH/CTA unremarkable. 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 Patient admitted to stroke service for monitoring, stroke workup and stroke prevention.

## 2025-05-14 PROBLEM — M19.90 UNSPECIFIED OSTEOARTHRITIS, UNSPECIFIED SITE: Chronic | Status: ACTIVE | Noted: 2025-05-13

## 2025-05-15 ENCOUNTER — NON-APPOINTMENT (OUTPATIENT)
Age: 75
End: 2025-05-15

## 2025-05-15 PROBLEM — I10 ESSENTIAL (PRIMARY) HYPERTENSION: Chronic | Status: ACTIVE | Noted: 2025-05-12

## 2025-05-19 DIAGNOSIS — M19.91 PRIMARY OSTEOARTHRITIS, UNSPECIFIED SITE: ICD-10-CM

## 2025-05-19 DIAGNOSIS — I10 ESSENTIAL (PRIMARY) HYPERTENSION: ICD-10-CM

## 2025-05-19 DIAGNOSIS — E78.5 HYPERLIPIDEMIA, UNSPECIFIED: ICD-10-CM

## 2025-05-19 DIAGNOSIS — Z79.82 LONG TERM (CURRENT) USE OF ASPIRIN: ICD-10-CM

## 2025-05-19 DIAGNOSIS — Z88.0 ALLERGY STATUS TO PENICILLIN: ICD-10-CM

## 2025-06-19 ENCOUNTER — NON-APPOINTMENT (OUTPATIENT)
Age: 75
End: 2025-06-19

## 2025-06-19 ENCOUNTER — APPOINTMENT (OUTPATIENT)
Dept: ELECTROPHYSIOLOGY | Facility: CLINIC | Age: 75
End: 2025-06-19
Payer: MEDICARE

## 2025-06-19 VITALS
BODY MASS INDEX: 33.77 KG/M2 | HEART RATE: 84 BPM | SYSTOLIC BLOOD PRESSURE: 156 MMHG | WEIGHT: 172 LBS | HEIGHT: 60 IN | DIASTOLIC BLOOD PRESSURE: 100 MMHG

## 2025-06-19 PROBLEM — Z45.09 ENCOUNTER FOR LOOP RECORDER CHECK: Status: ACTIVE | Noted: 2025-06-19

## 2025-06-19 PROBLEM — Z48.89 ENCOUNTER FOR POSTOPERATIVE WOUND CHECK: Status: ACTIVE | Noted: 2025-06-19

## 2025-06-19 PROBLEM — I63.9 CEREBROVASCULAR ACCIDENT (CVA), UNSPECIFIED MECHANISM: Status: ACTIVE | Noted: 2025-06-19

## 2025-06-19 PROCEDURE — 93291 INTERROG DEV EVAL SCRMS IP: CPT

## 2025-06-19 PROCEDURE — 93000 ELECTROCARDIOGRAM COMPLETE: CPT | Mod: 59

## 2025-06-19 PROCEDURE — 99212 OFFICE O/P EST SF 10 MIN: CPT | Mod: 25

## 2025-06-19 RX ORDER — ASPIRIN 81 MG/1
81 TABLET, COATED ORAL
Refills: 0 | Status: ACTIVE | COMMUNITY

## 2025-06-20 ENCOUNTER — APPOINTMENT (OUTPATIENT)
Dept: PAIN MANAGEMENT | Facility: CLINIC | Age: 75
End: 2025-06-20

## 2025-07-22 ENCOUNTER — APPOINTMENT (OUTPATIENT)
Dept: CARDIOLOGY | Facility: CLINIC | Age: 75
End: 2025-07-22
Payer: MEDICARE

## 2025-07-22 ENCOUNTER — NON-APPOINTMENT (OUTPATIENT)
Age: 75
End: 2025-07-22

## 2025-07-22 PROCEDURE — 93298 REM INTERROG DEV EVAL SCRMS: CPT

## 2025-07-24 ENCOUNTER — APPOINTMENT (OUTPATIENT)
Dept: NEUROLOGY | Facility: CLINIC | Age: 75
End: 2025-07-24
Payer: MEDICARE

## 2025-07-24 VITALS — BODY MASS INDEX: 33.78 KG/M2 | WEIGHT: 172.06 LBS | HEIGHT: 60 IN

## 2025-07-24 DIAGNOSIS — M54.16 RADICULOPATHY, LUMBAR REGION: ICD-10-CM

## 2025-07-24 PROCEDURE — 99215 OFFICE O/P EST HI 40 MIN: CPT

## 2025-07-24 PROCEDURE — G2211 COMPLEX E/M VISIT ADD ON: CPT

## 2025-08-26 ENCOUNTER — NON-APPOINTMENT (OUTPATIENT)
Age: 75
End: 2025-08-26

## 2025-08-26 ENCOUNTER — APPOINTMENT (OUTPATIENT)
Dept: CARDIOLOGY | Facility: CLINIC | Age: 75
End: 2025-08-26
Payer: MEDICARE

## 2025-08-26 PROCEDURE — 93298 REM INTERROG DEV EVAL SCRMS: CPT
